# Patient Record
Sex: FEMALE | Race: WHITE | NOT HISPANIC OR LATINO | Employment: FULL TIME | URBAN - METROPOLITAN AREA
[De-identification: names, ages, dates, MRNs, and addresses within clinical notes are randomized per-mention and may not be internally consistent; named-entity substitution may affect disease eponyms.]

---

## 2017-02-13 ENCOUNTER — ALLIED HEALTH/NURSE VISIT (OUTPATIENT)
Dept: NURSING | Facility: CLINIC | Age: 47
End: 2017-02-13
Payer: COMMERCIAL

## 2017-02-13 VITALS
BODY MASS INDEX: 41.1 KG/M2 | WEIGHT: 232 LBS | HEART RATE: 91 BPM | SYSTOLIC BLOOD PRESSURE: 144 MMHG | DIASTOLIC BLOOD PRESSURE: 86 MMHG

## 2017-02-13 DIAGNOSIS — Z30.42 DEPO-PROVERA CONTRACEPTIVE STATUS: Primary | ICD-10-CM

## 2017-02-13 PROCEDURE — 96372 THER/PROPH/DIAG INJ SC/IM: CPT

## 2017-02-13 PROCEDURE — 99207 ZZC NO CHARGE NURSE ONLY: CPT

## 2017-02-13 NOTE — MR AVS SNAPSHOT
After Visit Summary   2/13/2017    Adela Ernandez    MRN: [id number]           Patient Information     Date Of Birth          1970        Visit Information        Provider Department      2/13/2017 4:45 PM AN ANCILLARY [facility]        Today's Diagnoses     Depo-Provera contraceptive status    -  1       Follow-ups after your visit        Who to contact     If you have questions or need follow up information about today's clinic visit or your schedule please contact [facility] directly at [phone].  Normal or non-critical lab and imaging results will be communicated to you by [employer] plchart, letter or phone within 4 business days after the clinic has received the results. If you do not hear from us within 7 days, please contact the clinic through [email or web address] or phone. If you have a critical or abnormal lab result, we will notify you by phone as soon as possible.  Submit refill requests through [email or web address] or call your pharmacy and they will forward the refill request to us. Please allow 3 business days for your refill to be completed.          Additional Information About Your Visit        MyChart Information     [email or web address] gives you secure access to your electronic health record. If you see a primary care provider, you can also send messages to your care team and make appointments. If you have questions, please call your primary care clinic.  If you do not have a primary care provider, please call [phone] and they will assist you.        Care EveryWhere ID     This is your Care EveryWhere ID. This could be used by other organizations to access your [place] medical records  [id number]        Your Vitals Were     Pulse BMI (Body Mass Index)                91 41.1 kg/m2           Blood Pressure from Last 3 Encounters:   02/13/17 144/86   11/14/16 144/84   10/27/16 131/82    Weight from Last 3 Encounters:   02/13/17 232 lb (105.2 kg)   10/27/16 236 lb (107 kg)   10/21/16 238 lb  (108 kg)              We Performed the Following     C Medroxyprogesterone inj/1mg        Primary Care Provider Office Phone # Fax #    Gena Hernandez [phone]       [facility] [address] [facility] [address]        Thank you!     Thank you for choosing [facility]  for your care. Our goal is always to provide you with excellent care. Hearing back from our patients is one way we can continue to improve our services. Please take a few minutes to complete the written survey that you may receive in the mail after your visit with us. Thank you!             Your Updated Medication List - Protect others around you: Learn how to safely use, store and throw away your medicines at www.disposemymeds.org.          This list is accurate as of: 2/13/17  4:50 PM.  Always use your most recent med list.                   Brand Name Dispense Instructions for use    alum & mag hydroxide-simethicone 200-200-20 MG/5ML Susp suspension    MYLANTA/MAALOX     Take 15 mLs by mouth once as needed for indigestion       lidocaine 2 % solution    XYLOCAINE    15 mL    Take 15 mLs by mouth every 2 hours as needed for moderate pain swish and spit every 3-8 hours as needed; max 8 doses/24 hour period       losartan-hydrochlorothiazide 50-12.5 MG per tablet    HYZAAR         * medroxyPROGESTERone 150 MG/ML injection    DEPO-PROVERA    1 mL    Inject 1 mL (150 mg) into the muscle every 3 months       * medroxyPROGESTERone 150 MG/ML injection    DEPO-PROVERA    1 mL    Inject 1 mL (150 mg) into the muscle every 3 months       sulfamethoxazole-trimethoprim 400-80 MG per tablet    BACTRIM/SEPTRA    14 tablet    Take 1 tablet by mouth 2 times daily       * Notice:  This list has 2 medication(s) that are the same as other medications prescribed for you. Read the directions carefully, and ask your doctor or other care provider to review them with you.

## 2017-02-13 NOTE — NURSING NOTE
BLOOD PRESSURE: 144/86       URINE HCG:not indicated  The following medication was given:   MEDICATION: Depo Provera 150mg  ROUTE: IM  SITE: Arm - Right  : Mayomi  LOT #: [id number]  EXPIRATION:8/2019  NEXT INJECTION DUE: MAY 1 - 15 2017  Provider: PITA Gregory MA

## 2017-05-03 ENCOUNTER — ALLIED HEALTH/NURSE VISIT (OUTPATIENT)
Dept: NURSING | Facility: CLINIC | Age: 47
End: 2017-05-03
Payer: COMMERCIAL

## 2017-05-03 VITALS
HEART RATE: 78 BPM | SYSTOLIC BLOOD PRESSURE: 134 MMHG | DIASTOLIC BLOOD PRESSURE: 75 MMHG | WEIGHT: 236 LBS | BODY MASS INDEX: 41.81 KG/M2

## 2017-05-03 DIAGNOSIS — Z30.42 DEPO-PROVERA CONTRACEPTIVE STATUS: Primary | ICD-10-CM

## 2017-05-03 PROCEDURE — 99207 ZZC NO CHARGE NURSE ONLY: CPT

## 2017-05-03 PROCEDURE — 96372 THER/PROPH/DIAG INJ SC/IM: CPT

## 2017-05-03 NOTE — NURSING NOTE
BLOOD PRESSURE: 134/75           URINE HCG:not indicated    The following medication was given:     MEDICATION: Depo Provera 150mg  ROUTE: IM  SITE: Deltoid - Right  : Epoxy  LOT #: [id number]  EXPIRATION:11/2019  NEXT INJECTION DUE: July 19 - AUG 2 2017  Provider: PITA Gregory MA

## 2017-05-03 NOTE — MR AVS SNAPSHOT
After Visit Summary   5/3/2017    Adela Ernandez    MRN: [id number]           Patient Information     Date Of Birth          1970        Visit Information        Provider Department      5/3/2017 4:45 PM AN ANCILLARY [facility]        Today's Diagnoses     Depo-Provera contraceptive status    -  1       Follow-ups after your visit        Who to contact     If you have questions or need follow up information about today's clinic visit or your schedule please contact [facility] directly at [phone].  Normal or non-critical lab and imaging results will be communicated to you by Bombfellhart, letter or phone within 4 business days after the clinic has received the results. If you do not hear from us within 7 days, please contact the clinic through Trulioot or phone. If you have a critical or abnormal lab result, we will notify you by phone as soon as possible.  Submit refill requests through tweetTV or call your pharmacy and they will forward the refill request to us. Please allow 3 business days for your refill to be completed.          Additional Information About Your Visit        MyChart Information     tweetTV gives you secure access to your electronic health record. If you see a primary care provider, you can also send messages to your care team and make appointments. If you have questions, please call your primary care clinic.  If you do not have a primary care provider, please call [phone] and they will assist you.        Care EveryWhere ID     This is your Care EveryWhere ID. This could be used by other organizations to access your [place] medical records  [id number]        Your Vitals Were     Pulse BMI (Body Mass Index)                78 41.81 kg/m2           Blood Pressure from Last 3 Encounters:   05/03/17 134/75   02/13/17 144/86   11/14/16 144/84    Weight from Last 3 Encounters:   05/03/17 236 lb (107 kg)   02/13/17 232 lb (105.2 kg)   10/27/16 236 lb  (107 kg)              We Performed the Following     C Medroxyprogesterone inj/1mg        Primary Care Provider Office Phone # Fax #    Gena Hernandez [phone]       [facility] [address] [address] [facility] [address]        Thank you!     Thank you for choosing [facility]  for your care. Our goal is always to provide you with excellent care. Hearing back from our patients is one way we can continue to improve our services. Please take a few minutes to complete the written survey that you may receive in the mail after your visit with us. Thank you!             Your Updated Medication List - Protect others around you: Learn how to safely use, store and throw away your medicines at www.disposemymeds.org.          This list is accurate as of: 5/3/17  4:59 PM.  Always use your most recent med list.                   Brand Name Dispense Instructions for use    alum & mag hydroxide-simethicone 200-200-20 MG/5ML Susp suspension    MYLANTA/MAALOX     Take 15 mLs by mouth once as needed for indigestion       lidocaine 2 % solution    XYLOCAINE    15 mL    Take 15 mLs by mouth every 2 hours as needed for moderate pain swish and spit every 3-8 hours as needed; max 8 doses/24 hour period       losartan-hydrochlorothiazide 50-12.5 MG per tablet    HYZAAR         * medroxyPROGESTERone 150 MG/ML injection    DEPO-PROVERA    1 mL    Inject 1 mL (150 mg) into the muscle every 3 months       * medroxyPROGESTERone 150 MG/ML injection    DEPO-PROVERA    1 mL    Inject 1 mL (150 mg) into the muscle every 3 months       sulfamethoxazole-trimethoprim 400-80 MG per tablet    BACTRIM/SEPTRA    14 tablet    Take 1 tablet by mouth 2 times daily       * Notice:  This list has 2 medication(s) that are the same as other medications prescribed for you. Read the directions carefully, and ask your doctor or other care provider to review them with you.

## 2017-07-19 ENCOUNTER — ALLIED HEALTH/NURSE VISIT (OUTPATIENT)
Dept: NURSING | Facility: CLINIC | Age: 47
End: 2017-07-19
Payer: COMMERCIAL

## 2017-07-19 VITALS
DIASTOLIC BLOOD PRESSURE: 81 MMHG | WEIGHT: 238 LBS | SYSTOLIC BLOOD PRESSURE: 145 MMHG | HEART RATE: 81 BPM | BODY MASS INDEX: 42.16 KG/M2

## 2017-07-19 DIAGNOSIS — Z30.42 DEPO-PROVERA CONTRACEPTIVE STATUS: Primary | ICD-10-CM

## 2017-07-19 PROCEDURE — 99207 ZZC NO CHARGE NURSE ONLY: CPT

## 2017-07-19 PROCEDURE — 96372 THER/PROPH/DIAG INJ SC/IM: CPT

## 2017-07-19 NOTE — NURSING NOTE
BLOOD PRESSURE: 145/81    The following medication was given:     MEDICATION: Depo Provera 150mg  ROUTE: IM  SITE: Deltoid - Right  : Money Dashboard  LOT #: [id number]  EXPIRATION:12/2019  NEXT INJECTION DUE: OCT 4- 18 2017  Provider: PITA Gregory MA

## 2017-07-19 NOTE — MR AVS SNAPSHOT
After Visit Summary   7/19/2017    Adela Ernandez    MRN: [id number]           Patient Information     Date Of Birth          1970        Visit Information        Provider Department      7/19/2017 4:45 PM AN ANCILLARY [facility]        Today's Diagnoses     Depo-Provera contraceptive status    -  1       Follow-ups after your visit        Who to contact     If you have questions or need follow up information about today's clinic visit or your schedule please contact [facility] directly at [phone].  Normal or non-critical lab and imaging results will be communicated to you by STAR FESTIVALhart, letter or phone within 4 business days after the clinic has received the results. If you do not hear from us within 7 days, please contact the clinic through [email or web address] or phone. If you have a critical or abnormal lab result, we will notify you by phone as soon as possible.  Submit refill requests through ImmuRx or call your pharmacy and they will forward the refill request to us. Please allow 3 business days for your refill to be completed.          Additional Information About Your Visit        MyChart Information     ImmuRx gives you secure access to your electronic health record. If you see a primary care provider, you can also send messages to your care team and make appointments. If you have questions, please call your primary care clinic.  If you do not have a primary care provider, please call [phone] and they will assist you.        Care EveryWhere ID     This is your Care EveryWhere ID. This could be used by other organizations to access your [place] medical records  [id number]        Your Vitals Were     Pulse BMI (Body Mass Index)                81 42.16 kg/m2           Blood Pressure from Last 3 Encounters:   07/19/17 145/81   05/03/17 134/75   02/13/17 144/86    Weight from Last 3 Encounters:   07/19/17 238 lb (108 kg)   05/03/17 236 lb (107 kg)   02/13/17 232 lb  (105.2 kg)              We Performed the Following     C Medroxyprogesterone inj/1mg        Primary Care Provider Office Phone # Fax #    Gena Hernandez [phone]       [facility] [address] [facility] [address]        Equal Access to Services     SHANT CLARK : Hadii aad ku hadasho Soomaali, waaxda luqadaha, qaybta kaalmada adeegyada, waxay idiin hayaan adeeg khsidneysh la'wilsonn . So [facility] [phone].    ATENCIÓN: Si habla español, tiene a michael disposición servicios gratuitos de asistencia lingüística. Llame al [phone].    We comply with applicable federal civil rights laws and [place] laws. We do not discriminate on the basis of race, color, national origin, age, disability sex, sexual orientation or gender identity.            Thank you!     Thank you for choosing [facility]  for your care. Our goal is always to provide you with excellent care. Hearing back from our patients is one way we can continue to improve our services. Please take a few minutes to complete the written survey that you may receive in the mail after your visit with us. Thank you!             Your Updated Medication List - Protect others around you: Learn how to safely use, store and throw away your medicines at www.disposemymeds.org.          This list is accurate as of: 7/19/17  4:55 PM.  Always use your most recent med list.                   Brand Name Dispense Instructions for use Diagnosis    alum & mag hydroxide-simethicone 200-200-20 MG/5ML Susp suspension    MYLANTA/MAALOX     Take 15 mLs by mouth once as needed for indigestion    Abdominal pain, epigastric       lidocaine (viscous) 2 % solution    XYLOCAINE    15 mL    Take 15 mLs by mouth every 2 hours as needed for moderate pain swish and spit every 3-8 hours as needed; max 8 doses/24 hour period    Abdominal pain, epigastric       losartan-hydrochlorothiazide 50-12.5 MG per tablet    HYZAAR          * medroxyPROGESTERone 150  MG/ML injection    DEPO-PROVERA    1 mL    Inject 1 mL (150 mg) into the muscle every 3 months    Contraception       * medroxyPROGESTERone 150 MG/ML injection    DEPO-PROVERA    1 mL    Inject 1 mL (150 mg) into the muscle every 3 months    Surveillance for Depo-Provera contraception       sulfamethoxazole-trimethoprim 400-80 MG per tablet    BACTRIM/SEPTRA    14 tablet    Take 1 tablet by mouth 2 times daily    Acute urinary tract infection       * Notice:  This list has 2 medication(s) that are the same as other medications prescribed for you. Read the directions carefully, and ask your doctor or other care provider to review them with you.

## 2017-08-25 DIAGNOSIS — I10 ESSENTIAL HYPERTENSION: Primary | ICD-10-CM

## 2017-08-25 RX ORDER — LOSARTAN POTASSIUM AND HYDROCHLOROTHIAZIDE 12.5; 5 MG/1; MG/1
1 TABLET ORAL DAILY
Qty: 30 TABLET | Refills: 0 | Status: SHIPPED | OUTPATIENT
Start: 2017-08-25 | End: 2017-09-19

## 2017-08-25 NOTE — TELEPHONE ENCOUNTER
To provider to advise on refill. Med is listed historical. Last OV 10/27/16 in urgent care.  .Cathryn CHAON, RN, CPN

## 2017-08-25 NOTE — LETTER
August 25, 2017    Adela Ernandez  [address] [facility] [address]    Dear Adela,       We recently received a refill request for losartan-hydrochlorothiazide.  We have refilled this for a one time 30 day supply only because you are due for a:    Blood pressure office visit with provider      Please call at your earliest convenience so that there will not be a delay with your future refills.          Thank you,   Your [facility] Team/  [phone]

## 2017-09-19 ENCOUNTER — TELEPHONE (OUTPATIENT)
Dept: FAMILY MEDICINE | Facility: CLINIC | Age: 47
End: 2017-09-19

## 2017-09-19 ENCOUNTER — TELEPHONE (OUTPATIENT)
Dept: NURSING | Facility: CLINIC | Age: 47
End: 2017-09-19

## 2017-09-19 DIAGNOSIS — I10 ESSENTIAL HYPERTENSION: ICD-10-CM

## 2017-09-19 RX ORDER — LOSARTAN POTASSIUM AND HYDROCHLOROTHIAZIDE 12.5; 5 MG/1; MG/1
1 TABLET ORAL DAILY
Qty: 30 TABLET | Refills: 0 | Status: SHIPPED | OUTPATIENT
Start: 2017-09-19 | End: 2017-10-11

## 2017-09-19 NOTE — TELEPHONE ENCOUNTER
Losartan      Last Written Prescription Date: 8/25/17  Last Fill Quantity: 30, # refills: 0  Last Office Visit with G, P or [facility] prescribing provider: ?  Next 5 appointments (look out 90 days)     Oct 11, 2017  5:20 PM CDT   [facility]    [address] [facility] [address] [phone]                   Potassium   Date Value Ref Range Status   10/27/2016 3.8 3.4 - 5.3 mmol/L Final     Creatinine   Date Value Ref Range Status   10/27/2016 0.56 0.52 - 1.04 mg/dL Final     BP Readings from Last 3 Encounters:   07/19/17 145/81   05/03/17 134/75   02/13/17 144/86     Patient got a message in August that she can't get any more refills until she is seen by the doctor. She scheduled an appointment, but there is going to be a gap in her Losartin. Please call her at [phone].    Fara Dale RN  [facility]  [phone]

## 2017-09-19 NOTE — TELEPHONE ENCOUNTER
Reason for Call:  Other Medication Renewal    Detailed comments: Physical is scheduled for October 11, will run out of losartan on September 30th. Looking to have that renewed for the few days before she is able to come in for physical.     Phone Number Patient can be reached at: Cell number on file:    Telephone Information:   Mobile [phone]       Best Time: Any    Can we leave a detailed message on this number? YES    Call taken on 9/19/2017 at 7:21 AM by Anastasia Bello

## 2017-10-11 ENCOUNTER — OFFICE VISIT (OUTPATIENT)
Dept: FAMILY MEDICINE | Facility: CLINIC | Age: 47
End: 2017-10-11
Payer: COMMERCIAL

## 2017-10-11 VITALS
TEMPERATURE: 97 F | DIASTOLIC BLOOD PRESSURE: 89 MMHG | WEIGHT: 243 LBS | SYSTOLIC BLOOD PRESSURE: 139 MMHG | HEART RATE: 106 BPM | BODY MASS INDEX: 44.72 KG/M2 | HEIGHT: 62 IN

## 2017-10-11 DIAGNOSIS — E11.9 TYPE 2 DIABETES MELLITUS WITHOUT COMPLICATION, WITHOUT LONG-TERM CURRENT USE OF INSULIN (H): ICD-10-CM

## 2017-10-11 DIAGNOSIS — Z12.31 ENCOUNTER FOR SCREENING MAMMOGRAM FOR BREAST CANCER: ICD-10-CM

## 2017-10-11 DIAGNOSIS — I10 ESSENTIAL HYPERTENSION: ICD-10-CM

## 2017-10-11 DIAGNOSIS — Z12.4 SCREENING FOR MALIGNANT NEOPLASM OF CERVIX: ICD-10-CM

## 2017-10-11 DIAGNOSIS — E66.01 MORBID OBESITY (H): ICD-10-CM

## 2017-10-11 DIAGNOSIS — E78.1 HIGH TRIGLYCERIDES: ICD-10-CM

## 2017-10-11 DIAGNOSIS — Z30.42 ENCOUNTER FOR SURVEILLANCE OF INJECTABLE CONTRACEPTIVE: ICD-10-CM

## 2017-10-11 DIAGNOSIS — Z00.00 ROUTINE GENERAL MEDICAL EXAMINATION AT A HEALTH CARE FACILITY: Primary | ICD-10-CM

## 2017-10-11 LAB — HBA1C MFR BLD: 7.9 % (ref 4.3–6)

## 2017-10-11 PROCEDURE — 83036 HEMOGLOBIN GLYCOSYLATED A1C: CPT | Performed by: FAMILY MEDICINE

## 2017-10-11 PROCEDURE — 99396 PREV VISIT EST AGE 40-64: CPT | Mod: 25 | Performed by: FAMILY MEDICINE

## 2017-10-11 PROCEDURE — G0145 SCR C/V CYTO,THINLAYER,RESCR: HCPCS | Performed by: FAMILY MEDICINE

## 2017-10-11 PROCEDURE — 87624 HPV HI-RISK TYP POOLED RSLT: CPT | Performed by: FAMILY MEDICINE

## 2017-10-11 PROCEDURE — 80048 BASIC METABOLIC PNL TOTAL CA: CPT | Performed by: FAMILY MEDICINE

## 2017-10-11 PROCEDURE — 96372 THER/PROPH/DIAG INJ SC/IM: CPT | Performed by: FAMILY MEDICINE

## 2017-10-11 PROCEDURE — 36415 COLL VENOUS BLD VENIPUNCTURE: CPT | Performed by: FAMILY MEDICINE

## 2017-10-11 PROCEDURE — 99207 C FOOT EXAM  NO CHARGE: CPT | Mod: 25 | Performed by: FAMILY MEDICINE

## 2017-10-11 RX ORDER — MEDROXYPROGESTERONE ACETATE 150 MG/ML
150 INJECTION, SUSPENSION INTRAMUSCULAR
Qty: 1 ML | Refills: 3 | OUTPATIENT
Start: 2017-10-11 | End: 2018-10-30

## 2017-10-11 RX ORDER — LOSARTAN POTASSIUM AND HYDROCHLOROTHIAZIDE 12.5; 5 MG/1; MG/1
1 TABLET ORAL DAILY
Qty: 90 TABLET | Refills: 1 | Status: SHIPPED | OUTPATIENT
Start: 2017-10-11 | End: 2018-04-15

## 2017-10-11 NOTE — MR AVS SNAPSHOT
After Visit Summary   10/11/2017    Adela Ernandez    MRN: [id number]           Patient Information     Date Of Birth          1970        Visit Information        Provider Department      10/11/2017 5:20 PM Gena Hernandez MD [facility]        Today's Diagnoses     Routine general medical examination at a health care facility    -  1    Type 2 diabetes mellitus without complication, without long-term current use of insulin (H)        Morbid obesity (H)        Essential hypertension        High triglycerides        Screening for malignant neoplasm of cervix        Encounter for surveillance of injectable contraceptive        Encounter for screening mammogram for breast cancer          Care Instructions      Preventive Health Recommendations  Female Ages 40 to 49    Yearly exam:     See your health care provider every year in order to  1. Review health changes.   2. Discuss preventive care.    3. Review your medicines if your doctor prescribed any.      Get a Pap test every three years (unless you have an abnormal result and your provider advises testing more often).      If you get Pap tests with HPV test, you only need to test every 5 years, unless you have an abnormal result. You do not need a Pap test if your uterus was removed (hysterectomy) and you have not had cancer.      You should be tested each year for STDs (sexually transmitted diseases), if you're at risk.       Ask your doctor if you should have a mammogram.      Have a colonoscopy (test for colon cancer) if someone in your family has had colon cancer or polyps before age 50.       Have a cholesterol test every 5 years.       Have a diabetes test (fasting glucose) after age 45. If you are at risk for diabetes, you should have this test every 3 years.    Shots: Get a flu shot each year. Get a tetanus shot every 10 years.     Nutrition:     Eat at least 5 servings of fruits and vegetables each day.    Eat whole-grain bread,  whole-wheat pasta and brown rice instead of white grains and rice.    Talk to your provider about Calcium and Vitamin D.     Lifestyle    Exercise at least 150 minutes a week (an average of 30 minutes a day, 5 days a week). This will help you control your weight and prevent disease.    Limit alcohol to one drink per day.    No smoking.     Wear sunscreen to prevent skin cancer.    See your dentist every six months for an exam and cleaning.          Follow-ups after your visit        Future tests that were ordered for you today     Open Future Orders        Priority Expected Expires Ordered    MA Screening Digital Bilateral Routine  10/11/2018 10/11/2017            Who to contact     If you have questions or need follow up information about today's clinic visit or your schedule please contact [facility] at [phone].  Normal or non-critical lab and imaging results will be communicated to you by MyChart, letter or phone within 4 business days after the clinic has received the results. If you do not hear from us within 7 days, please contact the clinic through Inuvohart or phone. If you have a critical or abnormal lab result, we will notify you by phone as soon as possible.  Submit refill requests through [employer] or call your pharmacy and they will forward the refill request to us. Please allow 3 business days for your refill to be completed.          Additional Information About Your Visit        [employer] Information     [employer] gives you secure access to your electronic health record. If you see a primary care provider, you can also send messages to your care team and make appointments. If you have questions, please call your primary care clinic.  If you do not have a primary care provider, please call [phone] and they will assist you.        Care EveryWhere ID     This is your Care EveryWhere ID. This could be used by other organizations to access your [facility]  "records  [id number]        Your Vitals Were     Pulse Temperature Height BMI (Body Mass Index)          106 97  F (36.1  C) (Oral) 5' 2\" (1.575 m) 44.45 kg/m2         Blood Pressure from Last 3 Encounters:   10/11/17 139/89   07/19/17 145/81   05/03/17 134/75    Weight from Last 3 Encounters:   10/11/17 243 lb (110.2 kg)   07/19/17 238 lb (108 kg)   05/03/17 236 lb (107 kg)              We Performed the Following     Basic metabolic panel     FOOT EXAM     Hemoglobin A1c     HPV High Risk Types DNA Cervical     Medroxyprogesterone inj/1mg (Depo Provera J-Code)     Pap imaged thin layer screen with HPV - recommended age 30 - 65 years (select HPV order below)          Today's Medication Changes          These changes are accurate as of: 10/11/17  9:43 PM.  If you have any questions, ask your nurse or doctor.               These medicines have changed or have updated prescriptions.        Dose/Directions    losartan-hydrochlorothiazide 50-12.5 MG per tablet   Commonly known as:  HYZAAR   This may have changed:  additional instructions   Used for:  Essential hypertension   Changed by:  Gena Hernandez MD        Dose:  1 tablet   Take 1 tablet by mouth daily   Quantity:  90 tablet   Refills:  1            Where to get your medicines      These medications were sent to [facility] [address] [place] [address] [address] [place] [address] AT SEC of [address]  [address] [facility] [address]     Phone:  [phone]     losartan-hydrochlorothiazide 50-12.5 MG per tablet         Some of these will need a paper prescription and others can be bought over the counter.  Ask your nurse if you have questions.     You don't need a prescription for these medications     medroxyPROGESTERone 150 MG/ML injection                Primary Care Provider Office Phone # Fax #    Gena Hernandez [phone] [address] [facility] [address]        Equal Access to Services     SHANT CLARK AH: " Hadii jasmyn Suh, washireenda luqadaha, qagabrielleta kamaría elena yancey, rolf cheikh reymundopeggy butlercallie rosanneailyn lanelpeggy elizabeth. So [facility] [phone].    ATENCIÓN: Si habla tarik, tiene a michael disposición servicios gratuitos de asistencia lingüística. Llame al [phone].    We comply with applicable federal civil rights laws and [place] laws. We do not discriminate on the basis of race, color, national origin, age, disability, sex, sexual orientation, or gender identity.            Thank you!     Thank you for choosing [facility]  [facility] your [facility]. Our goal is always to provide you with excellent care. Hearing back from our patients is one way we can continue to improve our services. Please take a few minutes to complete the written survey that you may receive in the mail after your visit with us. Thank you!             Your Updated Medication List - Protect others around you: Learn how to safely use, store and throw away your medicines at www.disposemymeds.org.          This list is accurate as of: 10/11/17  9:43 PM.  Always use your most recent med list.                   Brand Name Dispense Instructions for use Diagnosis    losartan-hydrochlorothiazide 50-12.5 MG per tablet    HYZAAR    90 tablet    Take 1 tablet by mouth daily    Essential hypertension       medroxyPROGESTERone 150 MG/ML injection    DEPO-PROVERA    1 mL    Inject 1 mL (150 mg) into the muscle every 3 months    Encounter for surveillance of injectable contraceptive

## 2017-10-11 NOTE — PROGRESS NOTES
SUBJECTIVE:   CC: Adela Ernandez is an 47 year old woman who presents for preventive health visit.     Physical   Annual:     Getting at least 3 servings of Calcium per day::  Yes    Bi-annual eye exam::  Yes    Dental care twice a year::  Yes    Sleep apnea or symptoms of sleep apnea::  None    Diet::  Regular (no restrictions) and Low salt    Frequency of exercise::  1 day/week    Duration of exercise::  N/A    Taking medications regularly::  Yes    Medication side effects::  None     PT with diabetes, was seeing outside clinic. Has not had it checked in long time  Is not checking home BSs.   Is resistant to any unnecessary labwork due to cost.   She is agreeable to BMP and A1c        Today's PHQ-2 Score:   PHQ-2 ( 1999 [employer]) 10/11/2017   Q1: Little interest or pleasure in doing things 0   Q2: Feeling down, depressed or hopeless 0   PHQ-2 Score 0   Q1: Little interest or pleasure in doing things Not at all   Q2: Feeling down, depressed or hopeless Not at all   PHQ-2 Score 0       Abuse: Current or Past(Physical, Sexual or Emotional)- No  Do you feel safe in your environment - Yes    Social History   Substance Use Topics     Smoking status: Never Smoker     Smokeless tobacco: Never Used      Comment: lives in a smoke free household.     Alcohol use 0.0 oz/week     0 Standard drinks or equivalent per week      Comment: rare     The patient does not drink >3 drinks per day nor >7 drinks per week.    Reviewed orders with patient.  Reviewed health maintenance and updated orders accordingly - Yes  Labs reviewed in EPIC    Patient under age 50, mutual decision reflected in health maintenance.        Pertinent mammograms are reviewed under the imaging tab.  History of abnormal Pap smear: NO - age 30-65 PAP every 5 years with negative HPV co-testing recommended    Reviewed and updated as needed this visit by clinical staffTobacco  Allergies  Meds  Med Hx  Surg Hx  Fam Hx  Soc Hx        Reviewed and updated as  "needed this visit by Provider              ROS:  C: NEGATIVE for fever, chills, change in weight  I: NEGATIVE for worrisome rashes, moles or lesions  E: NEGATIVE for vision changes or irritation  ENT: NEGATIVE for ear, mouth and throat problems  R: NEGATIVE for significant cough or SOB  B: NEGATIVE for masses, tenderness or discharge  CV: NEGATIVE for chest pain, palpitations or peripheral edema  GI: NEGATIVE for nausea, abdominal pain, heartburn, or change in bowel habits  : NEGATIVE for unusual urinary or vaginal symptoms. Periods are regular.  M: NEGATIVE for significant arthralgias or myalgia  N: NEGATIVE for weakness, dizziness or paresthesias  P: NEGATIVE for changes in mood or affect     OBJECTIVE:   /89  Pulse 106  Temp 97  F (36.1  C) (Oral)  Ht 5' 2\" (1.575 m)  Wt 243 lb (110.2 kg)  BMI 44.45 kg/m2  EXAM:  GENERAL: healthy, alert and no distress  EYES: Eyes grossly normal to inspection, PERRL and conjunctivae and sclerae normal  HENT: ear canals and TM's normal, nose and mouth without ulcers or lesions  NECK: no adenopathy, no asymmetry, masses, or scars and thyroid normal to palpation  RESP: lungs clear to auscultation - no rales, rhonchi or wheezes  BREAST: normal without masses, tenderness or nipple discharge and no palpable axillary masses or adenopathy  CV: regular rate and rhythm, normal S1 S2, no S3 or S4, no murmur, click or rub, no peripheral edema and peripheral pulses strong  ABDOMEN: soft, nontender, no hepatosplenomegaly, no masses and bowel sounds normal   (female): normal female external genitalia, normal urethral meatus, vaginal mucosa pink, moist, well rugated, and normal cervix/adnexa/uterus without masses or discharge  MS: no gross musculoskeletal defects noted, no edema  SKIN: no suspicious lesions or rashes  NEURO: Normal strength and tone, mentation intact and speech normal  PSYCH: mentation appears normal, affect normal/bright    ASSESSMENT/PLAN:   1. Routine general " "medical examination at a health care facility        2. Type 2 diabetes mellitus without complication, without long-term current use of insulin (H)  As above  - Hemoglobin A1c  - Basic metabolic panel  - FOOT EXAM    3. Morbid obesity (H)  Encourage regular exercise and healthy diet    4. Essential hypertension  At goal on meds  - losartan-hydrochlorothiazide (HYZAAR) 50-12.5 MG per tablet; Take 1 tablet by mouth daily  Dispense: 90 tablet; Refill: 1    5. High triglycerides      6. Screening for malignant neoplasm of cervix    - Pap imaged thin layer screen with HPV - recommended age 30 - 65 years (select HPV order below)  - HPV High Risk Types DNA Cervical    7. Encounter for surveillance of injectable contraceptive    - medroxyPROGESTERone (DEPO-PROVERA) 150 MG/ML injection; Inject 1 mL (150 mg) into the muscle every 3 months  Dispense: 1 mL; Refill: 3    8. Encounter for screening mammogram for breast cancer    - MA Screening Digital Bilateral; Future    COUNSELING:  Reviewed preventive health counseling, as reflected in patient instructions       Regular exercise       Healthy diet/nutrition       Vision screening         reports that she has never smoked. She has never used smokeless tobacco.    Estimated body mass index is 44.45 kg/(m^2) as calculated from the following:    Height as of this encounter: 5' 2\" (1.575 m).    Weight as of this encounter: 243 lb (110.2 kg).   Weight management plan: Discussed healthy diet and exercise guidelines and patient will follow up in 12 months in clinic to re-evaluate.    Counseling Resources:  ATP IV Guidelines  Pooled Cohorts Equation Calculator  Breast Cancer Risk Calculator  FRAX Risk Assessment  ICSI Preventive Guidelines  Dietary Guidelines for Americans, 2010  USDA's MyPlate  ASA Prophylaxis  Lung CA Screening    Gena Marcos MD  [facility]  Answers for HPI/ROS submitted by the patient on 10/11/2017   PHQ-2 Score: 0    "

## 2017-10-11 NOTE — NURSING NOTE
MEDICATION: Depo Provera 150mg  ROUTE: IM  SITE: Deltoid - Right  : [employer]  LOT #: [id number]  EXP:01/2020  NEXT INJECTION DUE: 12/27/17 - 1/10/18   Provider: Dr. Gena Hernandez

## 2017-10-11 NOTE — NURSING NOTE
"Chief Complaint   Patient presents with     Physical       Initial /89  Pulse 106  Temp 97  F (36.1  C) (Oral)  Ht 5' 2\" (1.575 m)  Wt 243 lb (110.2 kg)  BMI 44.45 kg/m2 Estimated body mass index is 44.45 kg/(m^2) as calculated from the following:    Height as of this encounter: 5' 2\" (1.575 m).    Weight as of this encounter: 243 lb (110.2 kg).  Medication Reconciliation: darian Huffman MA      "

## 2017-10-12 LAB
ANION GAP SERPL CALCULATED.3IONS-SCNC: 10 MMOL/L (ref 3–14)
BUN SERPL-MCNC: 7 MG/DL (ref 7–30)
CALCIUM SERPL-MCNC: 8.6 MG/DL (ref 8.5–10.1)
CHLORIDE SERPL-SCNC: 99 MMOL/L (ref 94–109)
CO2 SERPL-SCNC: 27 MMOL/L (ref 20–32)
CREAT SERPL-MCNC: 0.52 MG/DL (ref 0.52–1.04)
GFR SERPL CREATININE-BSD FRML MDRD: >90 ML/MIN/1.7M2
GLUCOSE SERPL-MCNC: 148 MG/DL (ref 70–99)
POTASSIUM SERPL-SCNC: 3.5 MMOL/L (ref 3.4–5.3)
SODIUM SERPL-SCNC: 136 MMOL/L (ref 133–144)

## 2017-10-12 RX ORDER — METFORMIN HCL 500 MG
500 TABLET, EXTENDED RELEASE 24 HR ORAL 2 TIMES DAILY WITH MEALS
Qty: 180 TABLET | Refills: 0 | Status: SHIPPED | OUTPATIENT
Start: 2017-10-12 | End: 2018-05-29

## 2017-10-13 ENCOUNTER — TELEPHONE (OUTPATIENT)
Dept: EDUCATION SERVICES | Facility: CLINIC | Age: 47
End: 2017-10-13

## 2017-10-13 LAB
COPATH REPORT: NORMAL
PAP: NORMAL

## 2017-10-13 NOTE — TELEPHONE ENCOUNTER
Diabetes Education Scheduling Outreach #1:    Call to patient to schedule. Left message with phone number to call to schedule.    Plan for 2nd outreach attempt within 1 week.    Sonia Isabel  [facility]  Diabetes and Nutrition Scheduling

## 2017-10-16 ENCOUNTER — TELEPHONE (OUTPATIENT)
Dept: FAMILY MEDICINE | Facility: CLINIC | Age: 47
End: 2017-10-16

## 2017-10-16 LAB
FINAL DIAGNOSIS: NORMAL
HPV HR 12 DNA CVX QL NAA+PROBE: NEGATIVE
HPV16 DNA SPEC QL NAA+PROBE: NEGATIVE
HPV18 DNA SPEC QL NAA+PROBE: NEGATIVE
SPECIMEN DESCRIPTION: NORMAL

## 2017-10-16 NOTE — TELEPHONE ENCOUNTER
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 Please update, thank you.

## 2017-10-16 NOTE — TELEPHONE ENCOUNTER
Pt has been seen at other clinic and just recently resumed care at this clinic.  Just readdressed diabetes.  Will get statin on board in near future.     Gena Marcos

## 2017-10-20 NOTE — TELEPHONE ENCOUNTER
Diabetes Education Scheduling Outreach #2:    Call to patient to schedule. Left message with phone number to call to schedule.    Letter sent to patient requesting to call to schedule.    [facility]  Diabetes and Nutrition Scheduling

## 2017-12-06 ENCOUNTER — ALLIED HEALTH/NURSE VISIT (OUTPATIENT)
Dept: EDUCATION SERVICES | Facility: CLINIC | Age: 47
End: 2017-12-06
Payer: COMMERCIAL

## 2017-12-06 VITALS — BODY MASS INDEX: 44.63 KG/M2 | WEIGHT: 244 LBS

## 2017-12-06 DIAGNOSIS — E11.9 TYPE 2 DIABETES, HBA1C GOAL < 7% (H): Primary | ICD-10-CM

## 2017-12-06 PROCEDURE — G0108 DIAB MANAGE TRN  PER INDIV: HCPCS

## 2017-12-06 NOTE — MR AVS SNAPSHOT
After Visit Summary   12/6/2017    Adela Ernandez    MRN: [id number]           Patient Information     Date Of Birth          1970        Visit Information        Provider Department      12/6/2017 4:30 PM  DIABETIC ED RESOURCE [facility]        Today's Diagnoses     Type 2 diabetes, HbA1c goal < 7% (H)    -  1      Care Instructions    Recommend 3 carb choices per meal at 3 meals per day and 1 carb choice for a snack.  Patient goal: to exercise 15-20 minutes on 6 days per week.  Discuss metformin with pharmacist and or MD.    Call insurance re testing supplies and coverage for education.          Follow-ups after your visit        Your next 10 appointments already scheduled     Dec 27, 2017  7:30 AM CST   Nurse Only with AN ANCILLARY   [facility]    [address] [facility] [address] [phone]            Dec 27, 2017  9:15 AM CST   Diabetic Education with  DIABETIC ED RESOURCE   [facility] ([facility])    [address] [address] [phone]              Who to contact     If you have questions or need follow up information about today's clinic visit or your schedule please contact [facility] directly at [phone].  Normal or non-critical lab and imaging results will be communicated to you by [employer], letter or phone within 4 business days after the clinic has received the results. If you do not hear from us within 7 days, please contact the clinic through [employer] or phone. If you have a critical or abnormal lab result, we will notify you by phone as soon as possible.  Submit refill requests through [employer] or call your pharmacy and they will forward the refill request to us. Please allow 3 business days for your refill to be completed.          Additional Information About Your Visit        [employer] Information     [employer] gives you secure access to your  electronic health record. If you see a primary care provider, you can also send messages to your care team and make appointments. If you have questions, please call your primary care clinic.  If you do not have a primary care provider, please call [phone] and they will assist you.        Care EveryWhere ID     This is your Care EveryWhere ID. This could be used by other organizations to access your [facility] medical records  [id number]        Your Vitals Were     BMI (Body Mass Index)                   44.63 kg/m2            Blood Pressure from Last 3 Encounters:   10/11/17 139/89   07/19/17 145/81   05/03/17 134/75    Weight from Last 3 Encounters:   12/06/17 244 lb (110.7 kg)   10/11/17 243 lb (110.2 kg)   07/19/17 238 lb (108 kg)              Today, you had the following     No orders found for display       Primary Care Provider Office Phone # Fax #    Gena Hernandez [phone] [address] [facility] [address]        Equal Access to Services     [facility]: Hadii aad ku hadasho Soomaali, waaxda luqadaha, qaybta kaalmada adeegyada, waxay cheikh haymeli valdez . So [facility] [phone].    ATENCIÓN: Si habla español, tiene a michael disposición servicios gratuitos de asistencia lingüística. Llame al [phone].    We comply with applicable federal civil rights laws and [place] laws. We do not discriminate on the basis of race, color, national origin, age, disability, sex, sexual orientation, or gender identity.            Thank you!     Thank you for choosing [facility]  for your care. Our goal is always to provide you with excellent care. Hearing back from our patients is one way we can continue to improve our services. Please take a few minutes to complete the written survey that you may receive in the mail after your visit with us. Thank you!             Your Updated Medication List - Protect others around you: Learn how to safely use, store and  throw away your medicines at www.disposemymeds.org.          This list is accurate as of: 12/6/17  5:53 PM.  Always use your most recent med list.                   Brand Name Dispense Instructions for use Diagnosis    losartan-hydrochlorothiazide 50-12.5 MG per tablet    HYZAAR    90 tablet    Take 1 tablet by mouth daily    Essential hypertension       medroxyPROGESTERone 150 MG/ML injection    DEPO-PROVERA    1 mL    Inject 1 mL (150 mg) into the muscle every 3 months    Encounter for surveillance of injectable contraceptive       metFORMIN 500 MG 24 hr tablet    GLUCOPHAGE-XR    180 tablet    Take 1 tablet (500 mg) by mouth 2 times daily (with meals)    Type 2 diabetes mellitus without complication, without long-term current use of insulin (H)       RANITIDINE HCL PO      Take 150 mg by mouth as needed for other (pain in mid section from gall bladder)

## 2017-12-06 NOTE — PROGRESS NOTES
Diabetes Self Management Training: Initial education    Adela Ernandez presents today for education related to Type 2 diabetes.    She is accompanied by self    Patient's diabetes management related comments/concerns: I did not start metformin as it had a side effect that could bother my gall bladder. I already have trouble with my gall bladder.  I know I need to exercise more.  I have time.  Patient's emotional response to diabetes: expresses readiness to learn    Patient would like this visit to be focused around the following diabetes-related behaviors and goals: Healthy Eating    ASSESSMENT:  Patient Problem List and Family Medical History reviewed for relevant medical history, current medical status, and diabetes risk factors.  Patient diagnosed with diabetes about 1 year ago per patient recall. No formal education.    Current Diabetes Management per Patient:  Taking diabetes medications? no    *Abbreviated insulin dose documentation key: Insulin Trade Name (ifxmdkmta-tapkt-tgfzqu-bedtime) - i.e. Humalog 5-5-5-0 (Humalog 5 units at breakfast, 5 units at lunch, and 5 units at dinner).    Past Diabetes Education: No    Patient glucose self monitoring as follows: never.   BG meter: none  BG results: not available     BG values are: unable to assess  Patient's most recent   Lab Results   Component Value Date    A1C 7.9 10/11/2017    is not meeting goal of <7.0    Nutrition:  Patient eats 3 meals per day  Gets up at 4:30 am-  Breakfast - 5 am coffee, 7:30-8 am- granola bar or [employer]vita bars and water  Lunch - 1 pm - roast and carrots, drank water  Dinner - 5 pm- roast, carrots, pickles and olives, milk 2 cups   Snacks - may eat a snack at 10-10:30 am- handful of pretzels or nuts or fruit    Beverages:  water, milk skim, juice mary mix occasionally,  Diet Mt Dew occasionally, Gatorade G2, beer rarely,    Cultural/Rastafarian diet restrictions: No     Biggest Challenge to Healthy Eating: portion control, emotional eating  "and knowing what to eat    Physical Activity:    Type: Walking the dog for 30 minutes every other day.  Considering other options for activity.      Diabetes Risk Factors:  family history, age over 45 years, overweight/obesity and inactivity    Diabetes Complications:  Not discussed today.    Vitals:  Wt 244 lb (110.7 kg)  BMI 44.63 kg/m2  Estimated body mass index is 44.45 kg/(m^2) as calculated from the following:    Height as of 10/11/17: 5' 2\" (1.575 m).    Weight as of 10/11/17: 243 lb (110.2 kg).   Last 3 BP:   BP Readings from Last 3 Encounters:   10/11/17 139/89   07/19/17 145/81   05/03/17 134/75       History   Smoking Status     Never Smoker   Smokeless Tobacco     Never Used     Comment: lives in a smoke free household.       Labs:  Lab Results   Component Value Date    A1C 7.9 10/11/2017     Lab Results   Component Value Date     10/11/2017     Lab Results   Component Value Date    LDL 91 08/10/2012     HDL Cholesterol   Date Value Ref Range Status   08/10/2012 34 (L) 50 - 110 mg/dL Final   ]  GFR Estimate   Date Value Ref Range Status   10/11/2017 >90 >60 mL/min/1.7m2 Final     Comment:     Non  GFR Calc     GFR Estimate If Black   Date Value Ref Range Status   10/11/2017 >90 >60 mL/min/1.7m2 Final     Comment:      GFR Calc     Lab Results   Component Value Date    CR 0.52 10/11/2017     No results found for: MICROALBUMIN    Socio/Economic Considerations:    Support system: not assessed    Health Beliefs and Attitudes:   Patient Activation Measure Survey Score:  ODILON Score (Last Two) 1/26/2011 3/16/2012   ODILON Raw Score 42 40   Activation Score 66 60   ODILON Level 3 3       Stage of Change: PREPARATION (Decided to change - considering how)      Diabetes knowledge and skills assessment:     Patient is knowledgeable in diabetes management concepts related to: none    Patient needs further education on the following diabetes management concepts: Healthy Eating, Being " Active, Monitoring, Taking Medication, Problem Solving, Reducing Risks and Healthy Coping    Barriers to Learning Assessment: No Barriers identified,    Based on learning assessment above, most appropriate setting for further diabetes education would be: Group class or Individual setting.    INTERVENTION:   Education provided today on:  AADE Self-Care Behaviors:  Healthy Eating: carbohydrate counting, consistency in amount, composition, and timing of food intake, weight reduction and label reading  Being Active: relationship to blood glucose  Taking Medication: side effects of prescribed medications    Opportunities for ongoing education and support in diabetes-self management were discussed.    Pt verbalized understanding of concepts discussed and recommendations provided today.       Education Materials Provided:  [place] Understanding Diabetes Booklet and BG Log Sheet    PLAN:  See Patient Instructions for co-developed, patient-stated behavior change goals.  AVS printed and provided to patient today.  Patient given codes to check coverage for education with her insurance co.  Patient will also check insurance to see about coverage for testing supplies.    FOLLOW-UP:  Follow-up appointment scheduled on 12/27/17.  Chart routed to referring provider.  Patient will call if questions.    Ongoing plan for education and support: Follow-up visit with diabetes educator in 2-3 weeks    Malena Moore RN  BSN CDE    Time Spent: 60 minutes  Encounter Type: Individual    Any diabetes medication dose changes were made via the CDE Protocol and Collaborative Practice Agreement with the patient's referring provider. A copy of this encounter was shared with the provider.

## 2017-12-06 NOTE — PATIENT INSTRUCTIONS
Recommend 3 carb choices per meal at 3 meals per day and 1 carb choice for a snack.  Patient goal: to exercise 15-20 minutes on 6 days per week.  Discuss metformin with pharmacist and or MD.    Call insurance re testing supplies and coverage for education.

## 2017-12-27 ENCOUNTER — ALLIED HEALTH/NURSE VISIT (OUTPATIENT)
Dept: NURSING | Facility: CLINIC | Age: 47
End: 2017-12-27
Payer: COMMERCIAL

## 2017-12-27 VITALS — BODY MASS INDEX: 44.45 KG/M2 | WEIGHT: 243 LBS

## 2017-12-27 DIAGNOSIS — Z30.42 DEPO-PROVERA CONTRACEPTIVE STATUS: Primary | ICD-10-CM

## 2017-12-27 PROCEDURE — 99207 ZZC NO CHARGE NURSE ONLY: CPT

## 2017-12-27 PROCEDURE — 96372 THER/PROPH/DIAG INJ SC/IM: CPT

## 2017-12-27 NOTE — PROGRESS NOTES
BP: Data Unavailable    LAST PAP/EXAM: Lab Results       Component                Value               Date                       PAP                      NIL                 10/11/2017            URINE HCG:not indicated    The following medication was given:     MEDICATION: Depo Provera 150mg  ROUTE: IM  SITE: Deltoid - Right  : [employer]  LOT #: [id number]  EXP:5/2019  NEXT INJECTION DUE: 3/14/18 - 3/28/18   Provider: Gena Gregory MA

## 2017-12-27 NOTE — MR AVS SNAPSHOT
After Visit Summary   12/27/2017    Adela Ernandez    MRN: [id number]           Patient Information     Date Of Birth          1970        Visit Information        Provider Department      12/27/2017 7:30 AM AN ANCILLARY [facility]        Today's Diagnoses     Depo-Provera contraceptive status    -  1       Follow-ups after your visit        Who to contact     If you have questions or need follow up information about today's clinic visit or your schedule please contact [facility] directly at [phone].  Normal or non-critical lab and imaging results will be communicated to you by Tag[employer]art, letter or phone within 4 business days after the clinic has received the results. If you do not hear from us within 7 days, please contact the clinic through [employer] or phone. If you have a critical or abnormal lab result, we will notify you by phone as soon as possible.  Submit refill requests through [employer] or call your pharmacy and they will forward the refill request to us. Please allow 3 business days for your refill to be completed.          Additional Information About Your Visit        MyChart Information     [employer] gives you secure access to your electronic health record. If you see a primary care provider, you can also send messages to your care team and make appointments. If you have questions, please call your primary care clinic.  If you do not have a primary care provider, please call [phone] and they will assist you.        Care EveryWhere ID     This is your Care EveryWhere ID. This could be used by other organizations to access your [place] medical records  [id number]        Your Vitals Were     BMI (Body Mass Index)                   44.45 kg/m2            Blood Pressure from Last 3 Encounters:   10/11/17 139/89   07/19/17 145/81   05/03/17 134/75    Weight from Last 3 Encounters:   12/27/17 243 lb (110.2 kg)   12/06/17 244 lb (110.7 kg)   10/11/17 243 lb  (110.2 kg)              We Performed the Following     C Medroxyprogesterone inj/1mg     INJECTION INTRAMUSCULAR OR SUB-Q        Primary Care Provider Office Phone # Fax #    Gena Hernandez [phone] [address] [facility] [address]        Equal Access to Services     SHANT [facility] : Hadii aad ku hadasho Soomaali, waaxda luqadaha, qaybta kaalmada adeegyada, waxay idiin hayaan adeeg tedjace valdez . So [place] [phone].    ATENCIÓN: Si habla español, tiene a michael disposición servicios gratuitos de asistencia lingüística. Llame al [phone].    We comply with applicable federal civil rights laws and [place] laws. We do not discriminate on the basis of race, color, national origin, age, disability, sex, sexual orientation, or gender identity.            Thank you!     Thank you for choosing [facility]  for your care. Our goal is always to provide you with excellent care. Hearing back from our patients is one way we can continue to improve our services. Please take a few minutes to complete the written survey that you may receive in the mail after your visit with us. Thank you!             Your Updated Medication List - Protect others around you: Learn how to safely use, store and throw away your medicines at www.disposemymeds.org.          This list is accurate as of: 12/27/17  8:34 AM.  Always use your most recent med list.                   Brand Name Dispense Instructions for use Diagnosis    losartan-hydrochlorothiazide 50-12.5 MG per tablet    HYZAAR    90 tablet    Take 1 tablet by mouth daily    Essential hypertension       medroxyPROGESTERone 150 MG/ML injection    DEPO-PROVERA    1 mL    Inject 1 mL (150 mg) into the muscle every 3 months    Encounter for surveillance of injectable contraceptive       metFORMIN 500 MG 24 hr tablet    GLUCOPHAGE-XR    180 tablet    Take 1 tablet (500 mg) by mouth 2 times daily (with meals)    Type 2 diabetes mellitus without  complication, without long-term current use of insulin (H)       RANITIDINE HCL PO      Take 150 mg by mouth as needed for other (pain in mid section from gall bladder)

## 2018-01-15 ENCOUNTER — TELEPHONE (OUTPATIENT)
Dept: FAMILY MEDICINE | Facility: CLINIC | Age: 48
End: 2018-01-15

## 2018-01-15 NOTE — TELEPHONE ENCOUNTER
Panel Management Review      Patient has the following on her problem list:     Diabetes    ASA: Failed    Last A1C  Lab Results   Component Value Date    A1C 7.9 10/11/2017    A1C 5.5 08/16/2012    A1C 6.7 01/26/2011     A1C tested: Passed    Last LDL:    Lab Results   Component Value Date    CHOL 171 08/10/2012     Lab Results   Component Value Date    HDL 34 08/10/2012     Lab Results   Component Value Date    LDL 91 08/10/2012     Lab Results   Component Value Date    TRIG 227 08/10/2012     Lab Results   Component Value Date    CHOLHDLRATIO 5.0 08/10/2012     No results found for: NHDL    Is the patient on a Statin? NO             Is the patient on Aspirin? NO        Last three blood pressure readings:  BP Readings from Last 3 Encounters:   10/11/17 139/89   07/19/17 145/81   05/03/17 134/75       Date of last diabetes office visit: 10/11/17     Tobacco History:     History   Smoking Status     Never Smoker   Smokeless Tobacco     Never Used     Comment: lives in a smoke free household.         Hypertension   Last three blood pressure readings:  BP Readings from Last 3 Encounters:   10/11/17 139/89   07/19/17 145/81   05/03/17 134/75     Blood pressure: Passed    HTN Guidelines:  Age 18-59 BP range:  Less than 140/90  Age 60-85 with Diabetes:  Less than 140/90  Age 60-85 without Diabetes:  less than 150/90          Composite cancer screening  Chart review shows that this patient is due/due soon for the following None  Summary:    Patient is due/failing the following:   Diabetic check     Action needed:   Patient needs office visit for diabetic labs .    Type of outreach:    [employer]-A message.    Questions for provider review:    None                                                                                                                                    Gabriela Huffman MA       Chart routed to Care Team .

## 2018-01-23 ENCOUNTER — MYC MEDICAL ADVICE (OUTPATIENT)
Dept: FAMILY MEDICINE | Facility: CLINIC | Age: 48
End: 2018-01-23

## 2018-01-23 DIAGNOSIS — E11.9 TYPE 2 DIABETES MELLITUS WITHOUT COMPLICATION, WITHOUT LONG-TERM CURRENT USE OF INSULIN (H): Primary | ICD-10-CM

## 2018-01-23 NOTE — TELEPHONE ENCOUNTER
I would recommend she see diabetes educator to help with med management  She is also overdue for 3 month diab check.   I have placed order for diab ed. They will call her.

## 2018-03-14 ENCOUNTER — ALLIED HEALTH/NURSE VISIT (OUTPATIENT)
Dept: NURSING | Facility: CLINIC | Age: 48
End: 2018-03-14
Payer: COMMERCIAL

## 2018-03-14 VITALS
HEART RATE: 80 BPM | DIASTOLIC BLOOD PRESSURE: 88 MMHG | SYSTOLIC BLOOD PRESSURE: 138 MMHG | BODY MASS INDEX: 42.8 KG/M2 | WEIGHT: 234 LBS

## 2018-03-14 DIAGNOSIS — Z30.42 DEPO-PROVERA CONTRACEPTIVE STATUS: Primary | ICD-10-CM

## 2018-03-14 PROCEDURE — 96372 THER/PROPH/DIAG INJ SC/IM: CPT

## 2018-03-14 PROCEDURE — 99207 ZZC NO CHARGE NURSE ONLY: CPT

## 2018-03-14 NOTE — PROGRESS NOTES
BP: 138/88    LAST PAP/EXAM: Lab Results       Component                Value               Date                       PAP                      NIL                 10/11/2017            URINE HCG:not indicated    The following medication was given:     MEDICATION: Depo Provera 150mg  ROUTE: IM  SITE: Deltoid - Right  : [employer]  LOT #: [id number]  EXP:3/2020  NEXT INJECTION DUE: 5/30/18 - 6/13/18   Provider: PITA Gregory MA

## 2018-03-14 NOTE — MR AVS SNAPSHOT
After Visit Summary   3/14/2018    Adela Ernandez    MRN: [id number]           Patient Information     Date Of Birth          1970        Visit Information        Provider Department      3/14/2018 4:30 PM AN ANCILLARY [facility]        Today's Diagnoses     Depo-Provera contraceptive status    -  1       Follow-ups after your visit        Who to contact     If you have questions or need follow up information about today's clinic visit or your schedule please contact [facility] directly at [phone].  Normal or non-critical lab and imaging results will be communicated to you by Popcorn networkhart, letter or phone within 4 business days after the clinic has received the results. If you do not hear from us within 7 days, please contact the clinic through [employer] or phone. If you have a critical or abnormal lab result, we will notify you by phone as soon as possible.  Submit refill requests through oohilove or call your pharmacy and they will forward the refill request to us. Please allow 3 business days for your refill to be completed.          Additional Information About Your Visit        MyChart Information     oohilove gives you secure access to your electronic health record. If you see a primary care provider, you can also send messages to your care team and make appointments. If you have questions, please call your primary care clinic.  If you do not have a primary care provider, please call [phone] and they will assist you.        Care EveryWhere ID     This is your Care EveryWhere ID. This could be used by other organizations to access your [place] medical records  [id number]        Your Vitals Were     Pulse BMI (Body Mass Index)                80 42.8 kg/m2           Blood Pressure from Last 3 Encounters:   03/14/18 138/88   10/11/17 139/89   07/19/17 145/81    Weight from Last 3 Encounters:   03/14/18 234 lb (106.1 kg)   12/27/17 243 lb (110.2 kg)   12/06/17 244 lb  (110.7 kg)              We Performed the Following     C Medroxyprogesterone inj/1mg     INJECTION INTRAMUSCULAR OR SUB-Q        Primary Care Provider Office Phone # Fax #    Gena Hernandez [phone] [address] [facility] [address]        Equal Access to Services     SHANT CLARK : Hadii aad ku hadasho Soomaali, waaxda luqadaha, qaybta kaalmada adeegyada, waxay idiin hayaan adeeg tedjace lashayla . So [facility] [phone].    ATENCIÓN: Si habla español, tiene a michael disposición servicios gratuitos de asistencia lingüística. Llame al [phone].    We comply with applicable federal civil rights laws and [place] laws. We do not discriminate on the basis of race, color, national origin, age, disability, sex, sexual orientation, or gender identity.            Thank you!     Thank you for choosing [facility]  for your care. Our goal is always to provide you with excellent care. Hearing back from our patients is one way we can continue to improve our services. Please take a few minutes to complete the written survey that you may receive in the mail after your visit with us. Thank you!             Your Updated Medication List - Protect others around you: Learn how to safely use, store and throw away your medicines at www.disposemymeds.org.          This list is accurate as of 3/14/18  4:36 PM.  Always use your most recent med list.                   Brand Name Dispense Instructions for use Diagnosis    losartan-hydrochlorothiazide 50-12.5 MG per tablet    HYZAAR    90 tablet    Take 1 tablet by mouth daily    Essential hypertension       medroxyPROGESTERone 150 MG/ML injection    DEPO-PROVERA    1 mL    Inject 1 mL (150 mg) into the muscle every 3 months    Encounter for surveillance of injectable contraceptive       metFORMIN 500 MG 24 hr tablet    GLUCOPHAGE-XR    180 tablet    Take 1 tablet (500 mg) by mouth 2 times daily (with meals)    Type 2 diabetes mellitus without  complication, without long-term current use of insulin (H)       RANITIDINE HCL PO      Take 150 mg by mouth as needed for other (pain in mid section from gall bladder)

## 2018-04-15 DIAGNOSIS — I10 ESSENTIAL HYPERTENSION: ICD-10-CM

## 2018-04-16 RX ORDER — LOSARTAN POTASSIUM AND HYDROCHLOROTHIAZIDE 12.5; 5 MG/1; MG/1
TABLET ORAL
Qty: 90 TABLET | Refills: 1 | Status: SHIPPED | OUTPATIENT
Start: 2018-04-16 | End: 2018-10-21

## 2018-05-22 ENCOUNTER — TELEPHONE (OUTPATIENT)
Dept: FAMILY MEDICINE | Facility: CLINIC | Age: 48
End: 2018-05-22

## 2018-05-29 ENCOUNTER — OFFICE VISIT (OUTPATIENT)
Dept: FAMILY MEDICINE | Facility: CLINIC | Age: 48
End: 2018-05-29
Payer: COMMERCIAL

## 2018-05-29 VITALS
WEIGHT: 225 LBS | RESPIRATION RATE: 18 BRPM | BODY MASS INDEX: 41.41 KG/M2 | HEART RATE: 88 BPM | TEMPERATURE: 97.1 F | DIASTOLIC BLOOD PRESSURE: 75 MMHG | SYSTOLIC BLOOD PRESSURE: 139 MMHG | OXYGEN SATURATION: 99 % | HEIGHT: 62 IN

## 2018-05-29 DIAGNOSIS — M75.52 SUBDELTOID BURSITIS OF LEFT SHOULDER JOINT: ICD-10-CM

## 2018-05-29 DIAGNOSIS — E11.9 TYPE 2 DIABETES MELLITUS WITHOUT COMPLICATION, WITHOUT LONG-TERM CURRENT USE OF INSULIN (H): Primary | ICD-10-CM

## 2018-05-29 DIAGNOSIS — E66.01 MORBID OBESITY (H): ICD-10-CM

## 2018-05-29 PROCEDURE — 99214 OFFICE O/P EST MOD 30 MIN: CPT | Performed by: FAMILY MEDICINE

## 2018-05-29 ASSESSMENT — PAIN SCALES - GENERAL: PAINLEVEL: MILD PAIN (3)

## 2018-05-29 NOTE — PROGRESS NOTES
"SUBJECTIVE:  Adela Ernandez is a 48 year old female  who is seen for  left shoulder pain that started   3 years ago.   Onset of injury: Following acute injury.  Mechanism of injury:  Fell forward and hit shoulder .  symptoms wentaway and now returnen 6-8 months.  Relieving Factors:  \"icy hot\"   Worsening Factors: lifting arm over the shoulder   Symptoms have been gradual since that time.  Prior history of related problems: no prior problems with this area in the past.    Patients past medical, surgical, social and family histories reviewed.     REVIEW OF SYSTEMS:  CONSTITUTIONAL:NEGATIVE for fever, chills, change in weight    OBJECTIVE:  Blood pressure 139/75, pulse 88, temperature 97.1  F (36.2  C), temperature source Oral, resp. rate 18, height 5' 2\" (1.575 m), weight 225 lb (102.1 kg), SpO2 99 %, not currently breastfeeding.     GENERAL: healthy, alert and no distress  GAIT: normal   SKIN: no suspicious lesions or rashes  NEURO: Normal strength and tone, mentation intact and speech normal  PSYCH:  mentation appears normal and affect normal/bright    MUSCULOSKELETAL:  LEFT SHOULDER  Inspection: no swelling, bruising, discoloration, or obvious deformity or asymmetry  Tender: deltoid   Non-tender: SC joint, proximal-mid clavicle, mid-distal clavicle, AC joint, acromion, anterior capsule, proximal bicep tendon, greater tuberosity, proximal humerus and supraspinatus   Range of Motion  Active:all normal  Passive: all normal  Strength: rotator cuff strength full  Neurovascularly Intact Distally.      ICD-10-CM    1. Type 2 diabetes mellitus without complication, without long-term current use of insulin (H) E11.9    2. Subdeltoid bursitis of left shoulder joint M75.52 ORTHOPEDICS ADULT REFERRAL   3. Morbid obesity (H) E66.01     PLAN:trial of exercise  Follow up Dr. Marcos  Lose weight   "

## 2018-05-29 NOTE — NURSING NOTE
"Chief Complaint   Patient presents with     Shoulder Pain     left shoulder pain x 8 months - progressively getting worse - decrease ROM       Initial /77  Pulse 88  Temp 97.1  F (36.2  C) (Oral)  Resp 18  Ht 5' 2\" (1.575 m)  Wt 225 lb (102.1 kg)  SpO2 99%  BMI 41.15 kg/m2 Estimated body mass index is 41.15 kg/(m^2) as calculated from the following:    Height as of this encounter: 5' 2\" (1.575 m).    Weight as of this encounter: 225 lb (102.1 kg).  Medication Reconciliation: complete  Mihaela Kumar M.A.    "

## 2018-05-29 NOTE — MR AVS SNAPSHOT
After Visit Summary   5/29/2018    Adela Ernandez    MRN: [id number]           Patient Information     Date Of Birth          1970        Visit Information        Provider Department      5/29/2018 4:00 PM Chris Kirk MD [facility]        Today's Diagnoses     Type 2 diabetes mellitus without complication, without long-term current use of insulin (H)    -  1    Subdeltoid bursitis of left shoulder joint        Morbid obesity (H)          Care Instructions    [facility] - web site shoulder bursitis  rehab excercises          Follow-ups after your visit        Additional Services     ORTHOPEDICS ADULT REFERRAL       Your provider has referred you to: [facility]: [facility] [phone]   [email or web address]    Please be aware that coverage of these services is subject to the terms and limitations of your health insurance plan.  Call member services at your health plan with any benefit or coverage questions.      Please bring the following to your appointment:    >>   Any x-rays, CTs or MRIs which have been performed.  Contact the facility where they were done to arrange for  prior to your scheduled appointment.    >>   List of current medications   >>   This referral request   >>   Any documents/labs given to you for this referral                  Your next 10 appointments already scheduled     May 30, 2018  4:45 PM CDT   Nurse Only with VINEET TOMAS   [facility] ([facility])    [address] [facility] [address] [phone]              Who to contact     If you have questions or need follow up information about today's clinic visit or your schedule please contact [facility] directly at [phone].  Normal or non-critical lab and imaging results will be communicated to you by MyChart, letter or phone within 4 business days after the clinic has received the results.  "If you do not hear from us within 7 days, please contact the clinic through Ensenda or phone. If you have a critical or abnormal lab result, we will notify you by phone as soon as possible.  Submit refill requests through Ensenda or call your pharmacy and they will forward the refill request to us. Please allow 3 business days for your refill to be completed.          Additional Information About Your Visit        Melody ManagementharPixelated Information     Ensenda gives you secure access to your electronic health record. If you see a primary care provider, you can also send messages to your care team and make appointments. If you have questions, please call your primary care clinic.  If you do not have a primary care provider, please call [phone] and they will assist you.        Care EveryWhere ID     This is your Care EveryWhere ID. This could be used by other organizations to access your [place] medical records  [id number]        Your Vitals Were     Pulse Temperature Respirations Height Pulse Oximetry BMI (Body Mass Index)    88 97.1  F (36.2  C) (Oral) 18 5' 2\" (1.575 m) 99% 41.15 kg/m2       Blood Pressure from Last 3 Encounters:   05/29/18 139/75   03/14/18 138/88   10/11/17 139/89    Weight from Last 3 Encounters:   05/29/18 225 lb (102.1 kg)   03/14/18 234 lb (106.1 kg)   12/27/17 243 lb (110.2 kg)              We Performed the Following     ORTHOPEDICS ADULT REFERRAL        Primary Care Provider Office Phone # Fax #    Gena Hernandez [phone] [address] [address] [facility] [address]        Equal Access to Services     [facility]: Hadii aad ku hadasho Soomaali, waaxda luqadaha, qaybta kaalmada naye, rolf johnson. So [facility] [phone].    ATENCIÓN: Si habla español, tiene a michael disposición servicios gratuitos de asistencia lingüística. Carolinaame al [phone].    We comply with applicable federal civil rights laws and [place] laws. We do not discriminate on the " basis of race, color, national origin, age, disability, sex, sexual orientation, or gender identity.            Thank you!     Thank you for choosing [facility]  [facility]. Our goal is always to provide you with excellent care. Hearing back from our patients is one way we can continue to improve our services. Please take a few minutes to complete the written survey that you may receive in the mail after your visit with us. Thank you!             Your Updated Medication List - Protect others around you: Learn how to safely use, store and throw away your medicines at www.disposemymeds.org.          This list is accurate as of 5/29/18  4:52 PM.  Always use your most recent med list.                   Brand Name Dispense Instructions for use Diagnosis    losartan-hydrochlorothiazide 50-12.5 MG per tablet    HYZAAR    90 tablet    TAKE 1 TABLET BY MOUTH DAILY    Essential hypertension       medroxyPROGESTERone 150 MG/ML injection    DEPO-PROVERA    1 mL    Inject 1 mL (150 mg) into the muscle every 3 months    Encounter for surveillance of injectable contraceptive       RANITIDINE HCL PO      Take 150 mg by mouth as needed for other (pain in mid section from gall bladder)

## 2018-05-30 ENCOUNTER — ALLIED HEALTH/NURSE VISIT (OUTPATIENT)
Dept: NURSING | Facility: CLINIC | Age: 48
End: 2018-05-30
Payer: COMMERCIAL

## 2018-05-30 DIAGNOSIS — Z30.42 SURVEILLANCE FOR DEPO-PROVERA CONTRACEPTION: Primary | ICD-10-CM

## 2018-05-30 PROCEDURE — 99207 ZZC NO CHARGE NURSE ONLY: CPT

## 2018-05-30 PROCEDURE — 96372 THER/PROPH/DIAG INJ SC/IM: CPT

## 2018-05-30 NOTE — NURSING NOTE
>> Mali Garrison MA   5/30/2018  4:53 PM  BP: Data Unavailable    LAST PAP/EXAM: Lab Results       Component                Value               Date                       PAP                      NIL                 10/11/2017            URINE HCG:not indicated    The following medication wa  s given:     MEDICATION: Depo Provera 150mg  ROUTE: IM  SITE: Deltoid - Right  : [employer]   LOT #: [id number]  EXP:4/19  NEXT INJECTION DUE: 8/15/18 - 8/29/18   Provider: Gena Garrison M.A.

## 2018-05-30 NOTE — MR AVS SNAPSHOT
After Visit Summary   5/30/2018    Adela Ernandez    MRN: [id number]           Patient Information     Date Of Birth          1970        Visit Information        Provider [facility]      [facility]        Today's Diagnoses     Surveillance for Depo-Provera contraception    -  1       Follow-ups after your visit        Who to contact     If you have questions or need follow up information about today's clinic visit or your schedule please contact [facility] directly at [phone].  Normal or non-critical lab and imaging results will be communicated to you by MyChart, letter or phone within 4 business days after the clinic has received the results. If you do not hear from us within 7 days, please contact the clinic through Incentivet or phone. If you have a critical or abnormal lab result, we will notify you by phone as soon as possible.  Submit refill requests through [employer] or call your pharmacy and they will forward the refill request to us. Please allow 3 business days for your refill to be completed.          Additional Information About Your Visit        MyChart Information     [employer] Systems gives you secure access to your electronic health record. If you see a primary care provider, you can also send messages to your care team and make appointments. If you have questions, please call your primary care clinic.  If you do not have a primary care provider, please call [phone] and they will assist you.        Care EveryWhere ID     This is your Care EveryWhere ID. This could be used by other organizations to access your [place] medical records  [id number]         Blood Pressure from Last 3 Encounters:   05/29/18 139/75   03/14/18 138/88   10/11/17 139/89    Weight from Last 3 Encounters:   05/29/18 225 lb (102.1 kg)   03/14/18 234 lb (106.1 kg)   12/27/17 243 lb (110.2 kg)              Today, you had the following     No orders found  for display       Primary Care Provider Office Phone # Fax #    Gena Hernandez [phone] [address] [facility] [address]        Equal Access to Services     SHANT CLARK : Maik silvestre gavino Soaleahali, waaxda luqadaha, qaybta kaalmada adedanita, rolf winkler laAkinmeli johnson. So [facility] [phone].    ATENCIÓN: Si habla español, tiene a michael disposición servicios gratuitos de asistencia lingüística. Llame al [phone].    We comply with applicable federal civil rights laws and [place] laws. We do not discriminate on the basis of race, color, national origin, age, disability, sex, sexual orientation, or gender identity.            Thank you!     Thank you for choosing [facility]  for your care. Our goal is always to provide you with excellent care. Hearing back from our patients is one way we can continue to improve our services. Please take a few minutes to complete the written survey that you may receive in the mail after your visit with us. Thank you!             Your Updated Medication List - Protect others around you: Learn how to safely use, store and throw away your medicines at www.disposemymeds.org.          This list is accurate as of 5/30/18  4:58 PM.  Always use your most recent med list.                   Brand Name Dispense Instructions for use Diagnosis    losartan-hydrochlorothiazide 50-12.5 MG per tablet    HYZAAR    90 tablet    TAKE 1 TABLET BY MOUTH DAILY    Essential hypertension       medroxyPROGESTERone 150 MG/ML injection    DEPO-PROVERA    1 mL    Inject 1 mL (150 mg) into the muscle every 3 months    Encounter for surveillance of injectable contraceptive       RANITIDINE HCL PO      Take 150 mg by mouth as needed for other (pain in mid section from gall bladder)

## 2018-06-07 ENCOUNTER — TELEPHONE (OUTPATIENT)
Dept: FAMILY MEDICINE | Facility: CLINIC | Age: 48
End: 2018-06-07

## 2018-06-07 NOTE — TELEPHONE ENCOUNTER
Panel Management Review      Patient has the following on her problem list:     Diabetes    ASA: Not Required     Last A1C  Lab Results   Component Value Date    A1C 7.9 10/11/2017    A1C 5.5 08/16/2012    A1C 6.7 01/26/2011     A1C tested: Passed    Last LDL:    Lab Results   Component Value Date    CHOL 171 08/10/2012     Lab Results   Component Value Date    HDL 34 08/10/2012     Lab Results   Component Value Date    LDL 91 08/10/2012     Lab Results   Component Value Date    TRIG 227 08/10/2012     Lab Results   Component Value Date    CHOLHDLRATIO 5.0 08/10/2012     No results found for: NHDL    Is the patient on a Statin? NO             Is the patient on Aspirin? NO        Last three blood pressure readings:  BP Readings from Last 3 Encounters:   05/29/18 139/75   03/14/18 138/88   10/11/17 139/89       Date of last diabetes office visit: 5/29/18 with Dr Kirk     Tobacco History:     History   Smoking Status     Never Smoker   Smokeless Tobacco     Never Used     Comment: lives in a smoke free household.         Hypertension   Last three blood pressure readings:  BP Readings from Last 3 Encounters:   05/29/18 139/75   03/14/18 138/88   10/11/17 139/89     Blood pressure: Passed    HTN Guidelines:  Age 18-59 BP range:  Less than 140/90  Age 60-85 with Diabetes:  Less than 140/90  Age 60-85 without Diabetes:  less than 150/90      Composite cancer screening  Chart review shows that this patient is due/due soon for the following None  Summary:    Patient is due/failing the following:   Diabetic labs and eye exam    Action needed:   Routed to provider for review.    Type of outreach:    None, routed to provider for review.    Questions for provider review:    Was just seen on 5/29/18 and no labs were done. Please address                                                                                                                                    Gabriela Huffman MA       Chart routed to Provider  .

## 2018-08-15 ENCOUNTER — ALLIED HEALTH/NURSE VISIT (OUTPATIENT)
Dept: NURSING | Facility: CLINIC | Age: 48
End: 2018-08-15
Payer: COMMERCIAL

## 2018-08-15 VITALS
BODY MASS INDEX: 38.96 KG/M2 | WEIGHT: 213 LBS | HEART RATE: 82 BPM | DIASTOLIC BLOOD PRESSURE: 80 MMHG | SYSTOLIC BLOOD PRESSURE: 138 MMHG

## 2018-08-15 DIAGNOSIS — Z30.42 DEPO-PROVERA CONTRACEPTIVE STATUS: Primary | ICD-10-CM

## 2018-08-15 PROCEDURE — 99207 ZZC NO CHARGE NURSE ONLY: CPT

## 2018-08-15 PROCEDURE — 96372 THER/PROPH/DIAG INJ SC/IM: CPT

## 2018-08-15 NOTE — PROGRESS NOTES
BP: 138/80    LAST PAP/EXAM: Lab Results       Component                Value               Date                       PAP                      NIL                 10/11/2017            URINE HCG:not indicated    The following medication was given:     MEDICATION: Depo Provera 150mg  ROUTE: IM  SITE: Arm - Left  : [employer]  LOT #: [id number]  EXP:2020  NEXT INJECTION DUE: 10/31/18 - 18   Provider: emerald Gregory MA    Patient informed orders  10/11/2018, will need to be seen.  Elzbieta Gregory MA

## 2018-08-15 NOTE — MR AVS SNAPSHOT
After Visit Summary   8/15/2018    Adela Ernandez    MRN: [id number]           Patient Information     Date Of Birth          1970        Visit Information        Provider Department      8/15/2018 4:15 PM AN ANCILLARY [facility]        Today's Diagnoses     Depo-Provera contraceptive status    -  1       Follow-ups after your visit        Who to contact     If you have questions or need follow up information about today's clinic visit or your schedule please contact [facility] directly at [phone].  Normal or non-critical lab and imaging results will be communicated to you by JNJ Mobilehart, letter or phone within 4 business days after the clinic has received the results. If you do not hear from us within 7 days, please contact the clinic through Electrolytic Ozonet or phone. If you have a critical or abnormal lab result, we will notify you by phone as soon as possible.  Submit refill requests through TigerTrade or call your pharmacy and they will forward the refill request to us. Please allow 3 business days for your refill to be completed.          Additional Information About Your Visit        MyChart Information     TigerTrade gives you secure access to your electronic health record. If you see a primary care provider, you can also send messages to your care team and make appointments. If you have questions, please call your primary care clinic.  If you do not have a primary care provider, please call [phone] and they will assist you.        Care EveryWhere ID     This is your Care EveryWhere ID. This could be used by other organizations to access your [place] medical records  [id number]        Your Vitals Were     Pulse BMI (Body Mass Index)                82 38.96 kg/m2           Blood Pressure from Last 3 Encounters:   08/15/18 138/80   05/29/18 139/75   03/14/18 138/88    Weight from Last 3 Encounters:   08/15/18 213 lb (96.6 kg)   05/29/18 225 lb (102.1 kg)   03/14/18 234 lb  (106.1 kg)              We Performed the Following     C Medroxyprogesterone inj/1mg     INJECTION INTRAMUSCULAR OR SUB-Q        Primary Care Provider Office Phone # Fax #    Gena Hernandez [phone] [address] [facility] [address]        Equal Access to Services     SHANT CLARK : Hadii aad ku hadasho Soomaali, waaxda luqadaha, qaybta kaalmada adeegyada, waxay idiin hayaan adeeg tdeajce lashayla . So [facility] [phone].    ATENCIÓN: Si habla español, tiene a michael disposición servicios gratuitos de asistencia lingüística. Llame al [phone].    We comply with applicable federal civil rights laws and [place] laws. We do not discriminate on the basis of race, color, national origin, age, disability, sex, sexual orientation, or gender identity.            Thank you!     Thank you for choosing [facility]  for your care. Our goal is always to provide you with excellent care. Hearing back from our patients is one way we can continue to improve our services. Please take a few minutes to complete the written survey that you may receive in the mail after your visit with us. Thank you!             Your Updated Medication List - Protect others around you: Learn how to safely use, store and throw away your medicines at www.disposemymeds.org.          This list is accurate as of 8/15/18  4:29 PM.  Always use your most recent med list.                   Brand Name Dispense Instructions for use Diagnosis    losartan-hydrochlorothiazide 50-12.5 MG per tablet    HYZAAR    90 tablet    TAKE 1 TABLET BY MOUTH DAILY    Essential hypertension       medroxyPROGESTERone 150 MG/ML injection    DEPO-PROVERA    1 mL    Inject 1 mL (150 mg) into the muscle every 3 months    Encounter for surveillance of injectable contraceptive       RANITIDINE HCL PO      Take 150 mg by mouth as needed for other (pain in mid section from gall bladder)

## 2018-10-21 DIAGNOSIS — I10 ESSENTIAL HYPERTENSION: ICD-10-CM

## 2018-10-22 RX ORDER — LOSARTAN POTASSIUM AND HYDROCHLOROTHIAZIDE 12.5; 5 MG/1; MG/1
TABLET ORAL
Qty: 30 TABLET | Refills: 0 | Status: SHIPPED | OUTPATIENT
Start: 2018-10-22 | End: 2018-10-30

## 2018-10-30 ASSESSMENT — ENCOUNTER SYMPTOMS
HEARTBURN: 0
COUGH: 0
PARESTHESIAS: 0
EYE PAIN: 0
HEMATOCHEZIA: 0
NAUSEA: 0
WEAKNESS: 0
HEMATURIA: 0
DIZZINESS: 0
ARTHRALGIAS: 0
SORE THROAT: 0
PALPITATIONS: 0
DYSURIA: 0
FREQUENCY: 1
SHORTNESS OF BREATH: 0
BREAST MASS: 0
ABDOMINAL PAIN: 0
CONSTIPATION: 0
DIARRHEA: 0
HEADACHES: 1
MYALGIAS: 0
CHILLS: 0
JOINT SWELLING: 0
FEVER: 0
NERVOUS/ANXIOUS: 0

## 2018-10-31 ENCOUNTER — OFFICE VISIT (OUTPATIENT)
Dept: FAMILY MEDICINE | Facility: CLINIC | Age: 48
End: 2018-10-31
Payer: COMMERCIAL

## 2018-10-31 VITALS
HEIGHT: 62 IN | TEMPERATURE: 98.1 F | DIASTOLIC BLOOD PRESSURE: 73 MMHG | WEIGHT: 214 LBS | RESPIRATION RATE: 19 BRPM | BODY MASS INDEX: 39.38 KG/M2 | SYSTOLIC BLOOD PRESSURE: 135 MMHG | OXYGEN SATURATION: 96 % | HEART RATE: 99 BPM

## 2018-10-31 DIAGNOSIS — E11.9 TYPE 2 DIABETES MELLITUS WITHOUT COMPLICATION, WITHOUT LONG-TERM CURRENT USE OF INSULIN (H): ICD-10-CM

## 2018-10-31 DIAGNOSIS — E78.1 HIGH TRIGLYCERIDES: ICD-10-CM

## 2018-10-31 DIAGNOSIS — Z30.42 ENCOUNTER FOR SURVEILLANCE OF INJECTABLE CONTRACEPTIVE: ICD-10-CM

## 2018-10-31 DIAGNOSIS — Z00.00 ROUTINE HISTORY AND PHYSICAL EXAMINATION OF ADULT: Primary | ICD-10-CM

## 2018-10-31 DIAGNOSIS — Z12.31 ENCOUNTER FOR SCREENING MAMMOGRAM FOR BREAST CANCER: ICD-10-CM

## 2018-10-31 DIAGNOSIS — I10 ESSENTIAL HYPERTENSION: ICD-10-CM

## 2018-10-31 DIAGNOSIS — E66.01 MORBID OBESITY (H): ICD-10-CM

## 2018-10-31 LAB — HBA1C MFR BLD: 6.5 % (ref 0–5.6)

## 2018-10-31 PROCEDURE — 99396 PREV VISIT EST AGE 40-64: CPT | Performed by: FAMILY MEDICINE

## 2018-10-31 PROCEDURE — 99207 C FOOT EXAM  NO CHARGE: CPT | Mod: 25 | Performed by: FAMILY MEDICINE

## 2018-10-31 PROCEDURE — 99214 OFFICE O/P EST MOD 30 MIN: CPT | Mod: 25 | Performed by: FAMILY MEDICINE

## 2018-10-31 PROCEDURE — 82043 UR ALBUMIN QUANTITATIVE: CPT | Performed by: FAMILY MEDICINE

## 2018-10-31 PROCEDURE — 36415 COLL VENOUS BLD VENIPUNCTURE: CPT | Performed by: FAMILY MEDICINE

## 2018-10-31 PROCEDURE — 83036 HEMOGLOBIN GLYCOSYLATED A1C: CPT | Performed by: FAMILY MEDICINE

## 2018-10-31 PROCEDURE — 84443 ASSAY THYROID STIM HORMONE: CPT | Performed by: FAMILY MEDICINE

## 2018-10-31 PROCEDURE — 80053 COMPREHEN METABOLIC PANEL: CPT | Performed by: FAMILY MEDICINE

## 2018-10-31 RX ORDER — MEDROXYPROGESTERONE ACETATE 150 MG/ML
150 INJECTION, SUSPENSION INTRAMUSCULAR
Qty: 1 ML | Refills: 3 | OUTPATIENT
Start: 2018-10-31 | End: 2021-03-04

## 2018-10-31 RX ORDER — LOSARTAN POTASSIUM AND HYDROCHLOROTHIAZIDE 12.5; 5 MG/1; MG/1
1 TABLET ORAL DAILY
Qty: 90 TABLET | Refills: 1 | Status: SHIPPED | OUTPATIENT
Start: 2018-10-31 | End: 2019-05-25

## 2018-10-31 ASSESSMENT — ENCOUNTER SYMPTOMS
BREAST MASS: 0
PARESTHESIAS: 0
CHILLS: 0
ABDOMINAL PAIN: 0
FREQUENCY: 1
HEMATOCHEZIA: 0
FEVER: 0
DYSURIA: 0
PALPITATIONS: 0
DIARRHEA: 0
SORE THROAT: 0
EYE PAIN: 0
HEARTBURN: 0
DIZZINESS: 0
SHORTNESS OF BREATH: 0
JOINT SWELLING: 0
WEAKNESS: 0
NAUSEA: 0
HEADACHES: 1
MYALGIAS: 0
COUGH: 0
HEMATURIA: 0
ARTHRALGIAS: 0
NERVOUS/ANXIOUS: 0
CONSTIPATION: 0

## 2018-10-31 NOTE — PROGRESS NOTES
SUBJECTIVE:   CC: Adela Ernandez is an 48 year old woman who presents for preventive health visit.     Physical   Annual:     Getting at least 3 servings of Calcium per day:  Yes    Bi-annual eye exam:  Yes    Dental care twice a year:  Yes    Sleep apnea or symptoms of sleep apnea:  None    Diet:  Regular (no restrictions)    Frequency of exercise:  4-5 days/week    Duration of exercise:  45-60 minutes    Taking medications regularly:  Yes    Medication side effects:  None    Additional concerns today:  No    Pt with diabetes, has now lost weight over the last year and increased her exercise. She would like to recheck her A1c  She is on at statin/ace and aspirin        Today's PHQ-2 Score:   PHQ-2 ( 1999 Pf[employer]) 10/30/2018   Q1: Little interest or pleasure in doing things 0   Q2: Feeling down, depressed or hopeless 0   PHQ-2 Score 0   Q1: Little interest or pleasure in doing things Not at all   Q2: Feeling down, depressed or hopeless Not at all   PHQ-2 Score 0       Abuse: Current or Past(Physical, Sexual or Emotional)- No  Do you feel safe in your environment - Yes    Social History   Substance Use Topics     Smoking status: Never Smoker     Smokeless tobacco: Never Used      Comment: lives in a smoke free household.     Alcohol use 0.0 oz/week     0 Standard drinks or equivalent per week      Comment: rare     Alcohol Use 10/30/2018   If you drink alcohol do you typically have greater than 3 drinks per day OR greater than 7 drinks per week? No       Reviewed orders with patient.  Reviewed health maintenance and updated orders accordingly - Yes  Labs reviewed in EPIC    Patient under age 50, mutual decision reflected in health maintenance.      Pertinent mammograms are reviewed under the imaging tab.  History of abnormal Pap smear: NO - age 30-65 PAP every 5 years with negative HPV co-testing recommended  PAP / HPV Latest Ref Rng & Units 10/11/2017 1/23/2015 8/10/2012   PAP - NIL Negative NIL   HPV 16 DNA  NEG:Negative Negative - -   HPV 18 DNA NEG:Negative Negative - -   OTHER HR HPV NEG:Negative Negative - -     Reviewed and updated as needed this visit by clinical staff  Tobacco  Allergies  Meds  Med Hx  Surg Hx  Fam Hx  Soc Hx        Reviewed and updated as needed this visit by Provider            Review of Systems   Constitutional: Negative for chills and fever.   HENT: Negative for congestion, ear pain, hearing loss and sore throat.    Eyes: Negative for pain and visual disturbance.   Respiratory: Negative for cough and shortness of breath.    Cardiovascular: Negative for chest pain and palpitations.   Gastrointestinal: Negative for abdominal pain, constipation, diarrhea, heartburn, hematochezia and nausea.   Breasts:  Negative for tenderness, breast mass and discharge.   Genitourinary: Positive for frequency. Negative for dysuria, genital sores, hematuria, pelvic pain, urgency, vaginal bleeding and vaginal discharge.   Musculoskeletal: Negative for arthralgias, joint swelling and myalgias.   Skin: Negative for rash.   Neurological: Positive for headaches. Negative for dizziness, weakness and paresthesias.   Psychiatric/Behavioral: Negative for mood changes. The patient is not nervous/anxious.      CONSTITUTIONAL: NEGATIVE for fever, chills, change in weight  INTEGUMENTARU/SKIN: NEGATIVE for worrisome rashes, moles or lesions  EYES: NEGATIVE for vision changes or irritation  ENT: NEGATIVE for ear, mouth and throat problems  RESP: NEGATIVE for significant cough or SOB  BREAST: NEGATIVE for masses, tenderness or discharge  CV: NEGATIVE for chest pain, palpitations or peripheral edema  GI: NEGATIVE for nausea, abdominal pain, heartburn, or change in bowel habits  : NEGATIVE for unusual urinary or vaginal symptoms. Periods are regular.  MUSCULOSKELETAL: NEGATIVE for significant arthralgias or myalgia  NEURO: NEGATIVE for weakness, dizziness or paresthesias  PSYCHIATRIC: NEGATIVE for changes in mood or  "affect     OBJECTIVE:   /73  Pulse 99  Temp 98.1  F (36.7  C) (Oral)  Resp 19  Ht 5' 2\" (1.575 m)  Wt 214 lb (97.1 kg)  SpO2 96%  BMI 39.14 kg/m2  Physical Exam  GENERAL: healthy, alert and no distress  EYES: Eyes grossly normal to inspection, PERRL and conjunctivae and sclerae normal  HENT: ear canals and TM's normal, nose and mouth without ulcers or lesions  NECK: no adenopathy, no asymmetry, masses, or scars and thyroid normal to palpation  RESP: lungs clear to auscultation - no rales, rhonchi or wheezes  BREAST: normal without masses, tenderness or nipple discharge and no palpable axillary masses or adenopathy  CV: regular rate and rhythm, normal S1 S2, no S3 or S4, no murmur, click or rub, no peripheral edema and peripheral pulses strong  ABDOMEN: soft, nontender, no hepatosplenomegaly, no masses and bowel sounds normal  MS: no gross musculoskeletal defects noted, no edema  SKIN: no suspicious lesions or rashes  NEURO: Normal strength and tone, mentation intact and speech normal  PSYCH: mentation appears normal, affect normal/bright    Diagnostic Test Results:  none     ASSESSMENT/PLAN:   1. Routine history and physical examination of adult      2. Type 2 diabetes mellitus without complication, without long-term current use of insulin (H)  Not seen for about one year.  Has lost weight. Will get her up to date on labs.  Follow-up in 3-6 months depending on restuls  - Hemoglobin A1c  - Albumin Random Urine Quantitative with Creat Ratio  - Comprehensive metabolic panel  - TSH with free T4 reflex  - FOOT EXAM    3. Morbid obesity (H)  Improving, has had weight loss    4. Essential hypertension  At goal on meds  - losartan-hydrochlorothiazide (HYZAAR) 50-12.5 MG per tablet; Take 1 tablet by mouth daily  Dispense: 90 tablet; Refill: 1    5. High triglycerides  On statine    6. Encounter for surveillance of injectable contraceptive  She is due, it is working well, follow-up as needed  - " "medroxyPROGESTERone (DEPO-PROVERA) 150 MG/ML injection; Inject 1 mL (150 mg) into the muscle every 3 months  Dispense: 1 mL; Refill: 3    7. Encounter for screening mammogram for breast cancer  Pt to scheduled  - MA Screening Digital Bilateral; Future    COUNSELING:  Reviewed preventive health counseling, as reflected in patient instructions       Regular exercise       Healthy diet/nutrition       Vision screening       Contraception    BP Readings from Last 1 Encounters:   10/31/18 135/73     Estimated body mass index is 39.14 kg/(m^2) as calculated from the following:    Height as of this encounter: 5' 2\" (1.575 m).    Weight as of this encounter: 214 lb (97.1 kg).      Weight management plan: Discussed healthy diet and exercise guidelines and patient will follow up in 12 months in clinic to re-evaluate.     reports that she has never smoked. She has never used smokeless tobacco.      Counseling Resources:  ATP IV Guidelines  Pooled Cohorts Equation Calculator  Breast Cancer Risk Calculator  FRAX Risk Assessment  ICSI Preventive Guidelines  Dietary Guidelines for Americans, 2010  USDA's MyPlate  ASA Prophylaxis  Lung CA Screening    Gena Marcos MD  [facility]  Answers for HPI/ROS submitted by the patient on 10/30/2018   PHQ-2 Score: 0    "

## 2018-10-31 NOTE — MR AVS SNAPSHOT
After Visit Summary   10/31/2018    Adela Ernandez    MRN: [id number]           Patient Information     Date Of Birth          1970        Visit Information        Provider Department      10/31/2018 4:20 PM Gena Hernandez MD [facility]        Today's Diagnoses     Routine history and physical examination of adult    -  1    Type 2 diabetes mellitus without complication, without long-term current use of insulin (H)        Morbid obesity (H)        Essential hypertension        High triglycerides        Encounter for surveillance of injectable contraceptive        Encounter for screening mammogram for breast cancer          Care Instructions      Preventive Health Recommendations  Female Ages 40 to 49    Yearly exam:     See your health care provider every year in order to  1. Review health changes.   2. Discuss preventive care.    3. Review your medicines if your doctor prescribed any.      Get a Pap test every three years (unless you have an abnormal result and your provider advises testing more often).      If you get Pap tests with HPV test, you only need to test every 5 years, unless you have an abnormal result. You do not need a Pap test if your uterus was removed (hysterectomy) and you have not had cancer.      You should be tested each year for STDs (sexually transmitted diseases), if you're at risk.     Ask your doctor if you should have a mammogram.      Have a colonoscopy (test for colon cancer) if someone in your family has had colon cancer or polyps before age 50.       Have a cholesterol test every 5 years.       Have a diabetes test (fasting glucose) after age 45. If you are at risk for diabetes, you should have this test every 3 years.    Shots: Get a flu shot each year. Get a tetanus shot every 10 years.     Nutrition:     Eat at least 5 servings of fruits and vegetables each day.    Eat whole-grain bread, whole-wheat pasta and brown rice instead of white grains and  rice.    Get adequate Calcium and Vitamin D.      Lifestyle    Exercise at least 150 minutes a week (an average of 30 minutes a day, 5 days a week). This will help you control your weight and prevent disease.    Limit alcohol to one drink per day.    No smoking.     Wear sunscreen to prevent skin cancer.    See your dentist every six months for an exam and cleaning.          Follow-ups after your visit        Follow-up notes from your care team     Return in about 6 months (around 4/30/2019) for Diabetes.      Your next 10 appointments already scheduled     Nov 07, 2018  3:10 PM CST   Nurse Only with AN ANCILLARY   [facility]    [address] [facility] [address] [phone]              Future tests that were ordered for you today     Open Future Orders        Priority Expected Expires Ordered    MA Screening Digital Bilateral Routine  10/31/2019 10/31/2018            Who to contact     If you have questions or need follow up information about today's clinic visit or your schedule please contact [facility] directly at [phone].  Normal or non-critical lab and imaging results will be communicated to you by [email or web address], letter or phone within 4 business days after the clinic has received the results. If you do not hear from us within 7 days, please contact the clinic through [email or web address] or phone. If you have a critical or abnormal lab result, we will notify you by phone as soon as possible.  Submit refill requests through WeVue or call your pharmacy and they will forward the refill request to us. Please allow 3 business days for your refill to be completed.          Additional Information About Your Visit        WeVue Information     WeVue gives you secure access to your electronic health record. If you see a primary care provider, you can also send messages to your care team and make appointments. If you have questions, please call your primary  "care clinic.  If you do not have a primary care provider, please call [phone] and they will assist you.        Care EveryWhere ID     This is your Care EveryWhere ID. This could be used by other organizations to access your [place] medical records  [id number]        Your Vitals Were     Pulse Temperature Respirations Height Pulse Oximetry BMI (Body Mass Index)    99 98.1  F (36.7  C) (Oral) 19 5' 2\" (1.575 m) 96% 39.14 kg/m2       Blood Pressure from Last 3 Encounters:   10/31/18 135/73   08/15/18 138/80   05/29/18 139/75    Weight from Last 3 Encounters:   10/31/18 214 lb (97.1 kg)   08/15/18 213 lb (96.6 kg)   05/29/18 225 lb (102.1 kg)              We Performed the Following     Albumin Random Urine Quantitative with Creat Ratio     Comprehensive metabolic panel     FOOT EXAM     Hemoglobin A1c     OFFICE/OUTPT VISIT,EST,LEVL III     TSH with free T4 reflex          Where to get your medicines      These medications were sent to [facility] [id number] [place] [address] [address]  AT SEC OF [facility] [employer] [place]  [address] [facility] [address]     Phone:  [phone]     losartan-hydrochlorothiazide 50-12.5 MG per tablet         Some of these will need a paper prescription and others can be bought over the counter.  Ask your nurse if you have questions.     You don't need a prescription for these medications     medroxyPROGESTERone 150 MG/ML injection          Primary Care Provider Office Phone # Fax #    Gena Hernandez [phone] [address] [facility] [address]        Equal Access to Services     SHANT CLARK AH: Maik Suh, wakelly roe, qaybta kaalmada naye, rolf johnson. So [facility] [phone].    ATENCIÓN: Si habla español, tiene a michael disposición servicios gratuitos de asistencia lingüística. Llame al [phone].    We comply with applicable federal civil rights laws and [place] laws. " We do not discriminate on the basis of race, color, national origin, age, disability, sex, sexual orientation, or gender identity.            Thank you!     [facility] you [facility]  [facility]. Our goal is always to provide you with excellent care. Hearing back from our patients is one way we can continue to improve our services. Please take a few minutes to complete the written survey that you may receive in the mail after your visit with us. Thank you!             Your Updated Medication List - Protect others around you: Learn how to safely use, store and throw away your medicines at www.disposemymeds.org.          This list is accurate as of 10/31/18 11:59 PM.  Always use your most recent med list.                   Brand Name Dispense Instructions for use Diagnosis    losartan-hydrochlorothiazide 50-12.5 MG per tablet    HYZAAR    90 tablet    Take 1 tablet by mouth daily    Essential hypertension       medroxyPROGESTERone 150 MG/ML injection    DEPO-PROVERA    1 mL    Inject 1 mL (150 mg) into the muscle every 3 months    Encounter for surveillance of injectable contraceptive       RANITIDINE HCL PO      Take 150 mg by mouth as needed for other (pain in mid section from gall bladder)

## 2018-11-01 LAB
ALBUMIN SERPL-MCNC: 3.7 G/DL (ref 3.4–5)
ALP SERPL-CCNC: 86 U/L (ref 40–150)
ALT SERPL W P-5'-P-CCNC: 30 U/L (ref 0–50)
ANION GAP SERPL CALCULATED.3IONS-SCNC: 7 MMOL/L (ref 3–14)
AST SERPL W P-5'-P-CCNC: 18 U/L (ref 0–45)
BILIRUB SERPL-MCNC: 0.6 MG/DL (ref 0.2–1.3)
BUN SERPL-MCNC: 11 MG/DL (ref 7–30)
CALCIUM SERPL-MCNC: 8.6 MG/DL (ref 8.5–10.1)
CHLORIDE SERPL-SCNC: 103 MMOL/L (ref 94–109)
CO2 SERPL-SCNC: 29 MMOL/L (ref 20–32)
CREAT SERPL-MCNC: 0.65 MG/DL (ref 0.52–1.04)
CREAT UR-MCNC: 72 MG/DL
GFR SERPL CREATININE-BSD FRML MDRD: >90 ML/MIN/1.7M2
GLUCOSE SERPL-MCNC: 127 MG/DL (ref 70–99)
MICROALBUMIN UR-MCNC: <5 MG/L
MICROALBUMIN/CREAT UR: NORMAL MG/G CR (ref 0–25)
POTASSIUM SERPL-SCNC: 4 MMOL/L (ref 3.4–5.3)
PROT SERPL-MCNC: 7.4 G/DL (ref 6.8–8.8)
SODIUM SERPL-SCNC: 139 MMOL/L (ref 133–144)
TSH SERPL DL<=0.005 MIU/L-ACNC: 0.68 MU/L (ref 0.4–4)

## 2018-11-07 ENCOUNTER — ALLIED HEALTH/NURSE VISIT (OUTPATIENT)
Dept: NURSING | Facility: CLINIC | Age: 48
End: 2018-11-07
Payer: COMMERCIAL

## 2018-11-07 DIAGNOSIS — Z30.42 SURVEILLANCE FOR INJECTABLE MEDROXYPROGESTERONE/ESTRADIOL: Primary | ICD-10-CM

## 2018-11-07 PROCEDURE — 96372 THER/PROPH/DIAG INJ SC/IM: CPT

## 2018-11-07 PROCEDURE — 99207 ZZC NO CHARGE NURSE ONLY: CPT

## 2018-11-07 NOTE — MR AVS SNAPSHOT
After Visit Summary   11/7/2018    Adela Ernandez    MRN: [id number]           Patient Information     Date Of Birth          1970        Visit Information        Provider Department      11/7/2018 3:10 PM AN ANCILLARY [facility]        Today's Diagnoses     Surveillance for injectable medroxyprogesterone/estradiol    -  1       Follow-ups after your visit        Your next 10 appointments already scheduled     Nov 07, 2018  3:10 PM CST   Nurse Only with AN ANCILLARY   [facility]    [address] [facility] [address] [phone]              Who to contact     If you have questions or need follow up information about today's clinic visit or your schedule please contact [facility] directly at [phone].  Normal or non-critical lab and imaging results will be communicated to you by MyChart, letter or phone within 4 business days after the clinic has received the results. If you do not hear from us within 7 days, please contact the clinic through MyChart or phone. If you have a critical or abnormal lab result, we will notify you by phone as soon as possible.  Submit refill requests through Val[employer]oo [employer] or call your pharmacy and they will forward the refill request to us. Please allow 3 business days for your refill to be completed.          Additional Information About Your Visit        MyChart Information     [employer] gives you secure access to your electronic health record. If you see a primary care provider, you can also send messages to your care team and make appointments. If you have questions, please call your primary care clinic.  If you do not have a primary care provider, please call [phone] and they will assist you.        Care EveryWhere ID     This is your Care EveryWhere ID. This could be used by other organizations to access your [place] medical records  [id number]         Blood Pressure from Last 3  Encounters:   10/31/18 135/73   08/15/18 138/80   05/29/18 139/75    Weight from Last 3 Encounters:   10/31/18 214 lb (97.1 kg)   08/15/18 213 lb (96.6 kg)   05/29/18 225 lb (102.1 kg)              We Performed the Following     C Medroxyprogesterone inj/1mg     INJECTION INTRAMUSCULAR OR SUB-Q        Primary Care Provider Office Phone # Fax #    Gena Hernandez [phone] [address] [facility] [address]        Equal Access to Services     [facility]: Hadii jasmyn silvestre hadmamadou Somony, waaxda jyothi, qaybta kaalmada naye, rolf valdez . So [facility] [phone].    ATENCIÓN: Si habla español, tiene a michael disposición servicios gratuitos de asistencia lingüística. [facility] [phone].    We comply with applicable federal civil rights laws and [place] laws. We do not discriminate on the basis of race, color, national origin, age, disability, sex, sexual orientation, or gender identity.            Thank you!     Thank you for choosing [facility]  for your care. Our goal is always to provide you with excellent care. Hearing back from our patients is one way we can continue to improve our services. Please take a few minutes to complete the written survey that you may receive in the mail after your visit with us. Thank you!             Your Updated Medication List - Protect others around you: Learn how to safely use, store and throw away your medicines at www.disposemymeds.org.          This list is accurate as of 11/7/18  3:06 PM.  Always use your most recent med list.                   Brand Name Dispense Instructions for use Diagnosis    losartan-hydrochlorothiazide 50-12.5 MG per tablet    HYZAAR    90 tablet    Take 1 tablet by mouth daily    Essential hypertension       medroxyPROGESTERone 150 MG/ML injection    DEPO-PROVERA    1 mL    Inject 1 mL (150 mg) into the muscle every 3 months    Encounter for surveillance of injectable  contraceptive       RANITIDINE HCL PO      Take 150 mg by mouth as needed for other (pain in mid section from gall bladder)

## 2018-11-07 NOTE — PROGRESS NOTES
BP: Data Unavailable    LAST PAP/EXAM: Lab Results       Component                Value               Date                       PAP                      NIL                 10/11/2017            URINE HCG:not indicated    The following medication was given:     MEDICATION: Depo Provera 150mg  ROUTE: IM  SITE: Arm - Right  : amphastar  LOT #: [id number]  EXP:5/2020  NEXT INJECTION DUE: 1/23/19 - 2/6/19   Provider: Gena Gregory MA

## 2018-11-13 ENCOUNTER — TELEPHONE (OUTPATIENT)
Dept: FAMILY MEDICINE | Facility: CLINIC | Age: 48
End: 2018-11-13

## 2018-11-13 NOTE — TELEPHONE ENCOUNTER
Panel Management Review      Patient has the following on her problem list:     Diabetes    ASA: Not Required     Last A1C  Lab Results   Component Value Date    A1C 6.5 10/31/2018    A1C 7.9 10/11/2017    A1C 5.5 08/16/2012    A1C 6.7 01/26/2011     A1C tested: Passed    Last LDL:    Lab Results   Component Value Date    CHOL 171 08/10/2012     Lab Results   Component Value Date    HDL 34 08/10/2012     Lab Results   Component Value Date    LDL 91 08/10/2012     Lab Results   Component Value Date    TRIG 227 08/10/2012     Lab Results   Component Value Date    CHOLHDLRATIO 5.0 08/10/2012     No results found for: NHDL    Is the patient on a Statin? NO             Is the patient on Aspirin? NO        Last three blood pressure readings:  BP Readings from Last 3 Encounters:   10/31/18 135/73   08/15/18 138/80   05/29/18 139/75       Date of last diabetes office visit: 10/31/18     Tobacco History:     History   Smoking Status     Never Smoker   Smokeless Tobacco     Never Used     Comment: lives in a smoke free household.         Hypertension   Last three blood pressure readings:  BP Readings from Last 3 Encounters:   10/31/18 135/73   08/15/18 138/80   05/29/18 139/75     Blood pressure: Passed    HTN Guidelines:  Age 18-59 BP range:  Less than 140/90  Age 60-85 with Diabetes:  Less than 140/90  Age 60-85 without Diabetes:  less than 150/90      Composite cancer screening  Chart review shows that this patient is due/due soon for the following None  Summary:    Patient is due/failing the following:   Patient is not due for anything. Patient was just seen on 10/31/8 and rechecked diabetes and physical. Not needing to come in for 3-6 months depending on labs.     Action needed:   None    Type of outreach:    None    Questions for provider review:    None                                                                                                                                    YESI Doty       Chart routed  to none .

## 2019-01-02 DIAGNOSIS — Z30.42 SURVEILLANCE FOR INJECTABLE MEDROXYPROGESTERONE/ESTRADIOL: Primary | ICD-10-CM

## 2019-01-02 RX ORDER — MEDROXYPROGESTERONE ACETATE 150 MG/ML
150 INJECTION, SUSPENSION INTRAMUSCULAR
Status: COMPLETED | OUTPATIENT
Start: 2019-01-02 | End: 2019-09-19

## 2019-01-17 ENCOUNTER — MYC MEDICAL ADVICE (OUTPATIENT)
Dept: FAMILY MEDICINE | Facility: CLINIC | Age: 49
End: 2019-01-17

## 2019-01-18 NOTE — TELEPHONE ENCOUNTER
I faxed the form to Mis Descuentos @[phone].  A copy mailed to the patients home address.  [employer] message sent.  Naz Arechiga,

## 2019-01-23 ENCOUNTER — ALLIED HEALTH/NURSE VISIT (OUTPATIENT)
Dept: NURSING | Facility: CLINIC | Age: 49
End: 2019-01-23
Payer: COMMERCIAL

## 2019-01-23 VITALS
BODY MASS INDEX: 39.87 KG/M2 | DIASTOLIC BLOOD PRESSURE: 76 MMHG | HEART RATE: 82 BPM | SYSTOLIC BLOOD PRESSURE: 142 MMHG | WEIGHT: 218 LBS

## 2019-01-23 DIAGNOSIS — Z30.42 SURVEILLANCE FOR INJECTABLE MEDROXYPROGESTERONE/ESTRADIOL: Primary | ICD-10-CM

## 2019-01-23 PROCEDURE — 99207 ZZC NO CHARGE NURSE ONLY: CPT

## 2019-01-23 PROCEDURE — 96372 THER/PROPH/DIAG INJ SC/IM: CPT

## 2019-01-23 RX ADMIN — MEDROXYPROGESTERONE ACETATE 150 MG: 150 INJECTION, SUSPENSION INTRAMUSCULAR at 16:21

## 2019-02-22 ENCOUNTER — TELEPHONE (OUTPATIENT)
Dept: FAMILY MEDICINE | Facility: CLINIC | Age: 49
End: 2019-02-22

## 2019-02-22 NOTE — TELEPHONE ENCOUNTER
Panel Management Review      Patient has the following on her problem list:     Diabetes    ASA: Failed    Last A1C  Lab Results   Component Value Date    A1C 6.5 10/31/2018    A1C 7.9 10/11/2017    A1C 5.5 08/16/2012    A1C 6.7 01/26/2011     A1C tested: Passed    Last LDL:    Lab Results   Component Value Date    CHOL 171 08/10/2012     Lab Results   Component Value Date    HDL 34 08/10/2012     Lab Results   Component Value Date    LDL 91 08/10/2012     Lab Results   Component Value Date    TRIG 227 08/10/2012     Lab Results   Component Value Date    CHOLHDLRATIO 5.0 08/10/2012     No results found for: NHDL    Is the patient on a Statin? NO             Is the patient on Aspirin? NO        Last three blood pressure readings:  BP Readings from Last 3 Encounters:   01/23/19 142/76   10/31/18 135/73   08/15/18 138/80       Date of last diabetes office visit: 10/31/18     Tobacco History:     History   Smoking Status     Never Smoker   Smokeless Tobacco     Never Used     Comment: lives in a smoke free household.         Hypertension   Last three blood pressure readings:  BP Readings from Last 3 Encounters:   01/23/19 142/76   10/31/18 135/73   08/15/18 138/80     Blood pressure: FAILED    HTN Guidelines:  Age 18-59 BP range:  Less than 140/90  Age 60-85 with Diabetes:  Less than 140/90  Age 60-85 without Diabetes:  less than 150/90      Composite cancer screening  Chart review shows that this patient is due/due soon for the following None  Summary:    Patient is due/failing the following:   BP CHECK    Action needed:   Ancillary visit    Type of outreach:    Sent [employer] message.    Questions for provider review:    None                                                                                                                                    Gabriela Huffman MA       Chart routed to Care Team .

## 2019-04-12 ENCOUNTER — ALLIED HEALTH/NURSE VISIT (OUTPATIENT)
Dept: NURSING | Facility: CLINIC | Age: 49
End: 2019-04-12
Payer: COMMERCIAL

## 2019-04-12 VITALS
BODY MASS INDEX: 40.79 KG/M2 | HEART RATE: 78 BPM | DIASTOLIC BLOOD PRESSURE: 79 MMHG | WEIGHT: 223 LBS | SYSTOLIC BLOOD PRESSURE: 140 MMHG

## 2019-04-12 DIAGNOSIS — Z30.42 SURVEILLANCE FOR INJECTABLE MEDROXYPROGESTERONE/ESTRADIOL: Primary | ICD-10-CM

## 2019-04-12 PROCEDURE — 99207 ZZC NO CHARGE NURSE ONLY: CPT

## 2019-04-12 PROCEDURE — 96372 THER/PROPH/DIAG INJ SC/IM: CPT

## 2019-04-12 RX ADMIN — MEDROXYPROGESTERONE ACETATE 150 MG: 150 INJECTION, SUSPENSION INTRAMUSCULAR at 09:14

## 2019-04-12 NOTE — PROGRESS NOTES
Prior to injection, verified patient identity using patient's name and date of birth.  Due to injection administration, patient instructed to remain in clinic for 15 minutes  afterwards, and to report any adverse reaction to me immediately.    BP: 140/79    LAST PAP/EXAM:   Lab Results   Component Value Date    PAP NIL 10/11/2017     URINE HCG:not indicated    NEXT INJECTION DUE: 6/28/19 - 7/12/19       Drug Amount Wasted:  None.  Vial/Syringe: Single dose vial  Expiration Date:  7/2020    Elzbieta Gregory MA

## 2019-05-06 ENCOUNTER — TELEPHONE (OUTPATIENT)
Dept: FAMILY MEDICINE | Facility: CLINIC | Age: 49
End: 2019-05-06

## 2019-05-09 ENCOUNTER — TELEPHONE (OUTPATIENT)
Dept: FAMILY MEDICINE | Facility: CLINIC | Age: 49
End: 2019-05-09

## 2019-05-09 NOTE — TELEPHONE ENCOUNTER
Panel Management Review      Patient has the following on her problem list:     Diabetes    ASA: Failed    Last A1C  Lab Results   Component Value Date    A1C 6.5 10/31/2018    A1C 7.9 10/11/2017    A1C 5.5 08/16/2012    A1C 6.7 01/26/2011     A1C tested: Passed    Last LDL:    Lab Results   Component Value Date    CHOL 171 08/10/2012     Lab Results   Component Value Date    HDL 34 08/10/2012     Lab Results   Component Value Date    LDL 91 08/10/2012     Lab Results   Component Value Date    TRIG 227 08/10/2012     Lab Results   Component Value Date    CHOLHDLRATIO 5.0 08/10/2012     No results found for: NHDL    Is the patient on a Statin? NO             Is the patient on Aspirin? NO        Last three blood pressure readings:  BP Readings from Last 3 Encounters:   04/12/19 140/79   01/23/19 142/76   10/31/18 135/73       Date of last diabetes office visit: 10/31/18     Tobacco History:     History   Smoking Status     Never Smoker   Smokeless Tobacco     Never Used     Comment: lives in a smoke free household.         Hypertension   Last three blood pressure readings:  BP Readings from Last 3 Encounters:   04/12/19 140/79   01/23/19 142/76   10/31/18 135/73     Blood pressure: Passed    HTN Guidelines:  Less than 140/90      Composite cancer screening  Chart review shows that this patient is due/due soon for the following None  Summary:    Patient is due/failing the following:   A1C, ASA, BP CHECK and LDL    Action needed:   Patient needs office visit for dm check.    Type of outreach:    Sent [employer] message.    Questions for provider review:    None                                                                                                                                    Gabriela Huffman MA       Chart routed to Care Team .

## 2019-05-13 ENCOUNTER — OFFICE VISIT (OUTPATIENT)
Dept: OPTOMETRY | Facility: CLINIC | Age: 49
End: 2019-05-13
Payer: COMMERCIAL

## 2019-05-13 DIAGNOSIS — H52.4 PRESBYOPIA: ICD-10-CM

## 2019-05-13 DIAGNOSIS — H52.03 HYPEROPIA, BILATERAL: Primary | ICD-10-CM

## 2019-05-13 DIAGNOSIS — E11.9 TYPE 2 DIABETES MELLITUS WITHOUT COMPLICATION, WITHOUT LONG-TERM CURRENT USE OF INSULIN (H): ICD-10-CM

## 2019-05-13 DIAGNOSIS — H52.223 REGULAR ASTIGMATISM OF BOTH EYES: ICD-10-CM

## 2019-05-13 PROCEDURE — 92004 COMPRE OPH EXAM NEW PT 1/>: CPT | Performed by: OPTOMETRIST

## 2019-05-13 PROCEDURE — 92015 DETERMINE REFRACTIVE STATE: CPT | Performed by: OPTOMETRIST

## 2019-05-13 ASSESSMENT — TONOMETRY
OD_IOP_MMHG: 18
IOP_METHOD: APPLANATION
OS_IOP_MMHG: 18

## 2019-05-13 ASSESSMENT — CONF VISUAL FIELD
OS_NORMAL: 1
OD_NORMAL: 1
METHOD: COUNTING FINGERS

## 2019-05-13 ASSESSMENT — VISUAL ACUITY
OD_CC+: -1
OD_CC: 20/30
OS_CC: 20/30
CORRECTION_TYPE: GLASSES
METHOD: SNELLEN - LINEAR
OS_CC: 20/25
OD_CC: 20/40-1

## 2019-05-13 ASSESSMENT — KERATOMETRY
OD_K2POWER_DIOPTERS: 42.50
OS_AXISANGLE2_DEGREES: 14
OD_AXISANGLE2_DEGREES: 151
OD_K1POWER_DIOPTERS: 43.00
OS_K1POWER_DIOPTERS: 42.75
OS_K2POWER_DIOPTERS: 42.00

## 2019-05-13 ASSESSMENT — SLIT LAMP EXAM - LIDS
COMMENTS: NORMAL
COMMENTS: NORMAL

## 2019-05-13 ASSESSMENT — REFRACTION_MANIFEST
OD_CYLINDER: +0.75
OD_SPHERE: +1.75
METHOD_AUTOREFRACTION: 1
OS_SPHERE: +1.50
OD_AXIS: 170
OS_SPHERE: +1.75
OS_CYLINDER: +1.00
OS_AXIS: 015
OD_AXIS: 001
OS_ADD: +2.00
OS_AXIS: 004
OD_ADD: +2.00
OD_CYLINDER: +0.75
OS_CYLINDER: +1.25
OD_SPHERE: +2.00

## 2019-05-13 ASSESSMENT — CUP TO DISC RATIO
OD_RATIO: 0.4
OS_RATIO: 0.4

## 2019-05-13 ASSESSMENT — REFRACTION_WEARINGRX
OD_ADD: +1.75
OS_CYLINDER: +0.75
OD_CYLINDER: +0.50
OD_SPHERE: +1.50
OS_AXIS: 015
OS_SPHERE: +1.00
OD_AXIS: 145
OS_ADD: +1.75
SPECS_TYPE: PAL

## 2019-05-13 ASSESSMENT — EXTERNAL EXAM - RIGHT EYE: OD_EXAM: NORMAL

## 2019-05-13 ASSESSMENT — EXTERNAL EXAM - LEFT EYE: OS_EXAM: NORMAL

## 2019-05-13 NOTE — PROGRESS NOTES
Chief Complaint   Patient presents with     Annual Eye Exam     Yearly        Lab Results   Component Value Date    A1C 6.5 10/31/2018    A1C 7.9 10/11/2017    A1C 5.5 08/16/2012    A1C 6.7 01/26/2011     Patient said that she is considered pre diabetic   She would like to talk to the doctor about the possibility of wearing contacts, she has been told before that it wasn't a good idea due to astigmatism       Last Eye Exam: 2/2018  Dilated Previously: Yes    What are you currently using to see?  Glasses, wears them all of the time        Distance Vision Acuity: Satisfied with vision, not aware of any changes     Near Vision Acuity: Satisfied with vision while reading and using computer with glasses, no changes     Eye Comfort: good, tired sometimes   Do you use eye drops? : No  Occupation or Hobbies: Accounting -computer use    Juanita Apple Optometric Assistant           Medical, surgical and family histories reviewed and updated 5/13/2019.       OBJECTIVE: See Ophthalmology exam    ASSESSMENT:    ICD-10-CM    1. Hyperopia, bilateral H52.03    2. Regular astigmatism of both eyes H52.223    3. Presbyopia H52.4    4. Type 2 diabetes mellitus without complication, without long-term current use of insulin (H) E11.9       PLAN:     Patient Instructions   Patient was advised of today's exam findings.  Fill glasses prescription  Contact lenses not advised due to astigmatism and need of reading prescription   Return in 1 year for eye exam    Christiana Chung O.D.  [facility]   [address] [place], [place] [id number] [phone]

## 2019-05-13 NOTE — LETTER
5/13/2019         RE: Adela Ernandez  [address] [facility] [address]        Dear Colleague,    Thank you for referring your patient, Adela Ernandez, to the [facility]. No diabetic retinopathy found at eye exam. See attached chart note for more details.          Again, thank you for allowing me to participate in the care of your patient.        Sincerely,        Christiana Chung OD

## 2019-05-13 NOTE — PATIENT INSTRUCTIONS
Patient was advised of today's exam findings.  Fill glasses prescription  Contact lenses not advised due to astigmatism and need of reading prescription   Return in 1 year for eye exam    Christiana Chung O.D.  [facility]   [address] [place] [address] [phone]

## 2019-05-25 DIAGNOSIS — I10 ESSENTIAL HYPERTENSION: ICD-10-CM

## 2019-05-29 DIAGNOSIS — I10 ESSENTIAL HYPERTENSION: ICD-10-CM

## 2019-05-29 RX ORDER — LOSARTAN POTASSIUM AND HYDROCHLOROTHIAZIDE 12.5; 5 MG/1; MG/1
1 TABLET ORAL DAILY
Qty: 90 TABLET | Refills: 0 | Status: SHIPPED | OUTPATIENT
Start: 2019-05-29 | End: 2019-11-12

## 2019-05-31 RX ORDER — LOSARTAN POTASSIUM AND HYDROCHLOROTHIAZIDE 12.5; 5 MG/1; MG/1
1 TABLET ORAL DAILY
Qty: 90 TABLET | Refills: 0 | OUTPATIENT
Start: 2019-05-31

## 2019-06-04 ENCOUNTER — TELEPHONE (OUTPATIENT)
Dept: FAMILY MEDICINE | Facility: CLINIC | Age: 49
End: 2019-06-04

## 2019-06-04 DIAGNOSIS — I10 ESSENTIAL HYPERTENSION: ICD-10-CM

## 2019-06-04 RX ORDER — LOSARTAN POTASSIUM AND HYDROCHLOROTHIAZIDE 12.5; 5 MG/1; MG/1
1 TABLET ORAL DAILY
Qty: 90 TABLET | Refills: 0 | OUTPATIENT
Start: 2019-06-04

## 2019-06-04 NOTE — TELEPHONE ENCOUNTER
I spoke with pharmacist Vanessa and she states they did not get the refill sent for Hyzaar on 5/29/19.  On 5/31 a second refill refill was denied as it was a duplicate of the one sent on 5/29/19. Vanessa was given a verbal ok to refill for #90 . There is also a request for another refill of same medication today which I will deny as this has been taken care of.  Franca Lindsey RN

## 2019-06-04 NOTE — TELEPHONE ENCOUNTER
Pharmacy is calling stating received a RX Denial for RX: -hydrochlorothiazide (HYZAAR) 50-12.5 MG tablet and would like to discuss details. Caller informed that calls received after 3pm may not be returned same day. Please call to discuss. Thank you.

## 2019-07-01 ENCOUNTER — ALLIED HEALTH/NURSE VISIT (OUTPATIENT)
Dept: NURSING | Facility: CLINIC | Age: 49
End: 2019-07-01
Payer: COMMERCIAL

## 2019-07-01 VITALS
DIASTOLIC BLOOD PRESSURE: 84 MMHG | WEIGHT: 228 LBS | SYSTOLIC BLOOD PRESSURE: 128 MMHG | BODY MASS INDEX: 41.7 KG/M2 | HEART RATE: 99 BPM

## 2019-07-01 DIAGNOSIS — Z30.42 DEPO-PROVERA CONTRACEPTIVE STATUS: Primary | ICD-10-CM

## 2019-07-01 PROCEDURE — 96372 THER/PROPH/DIAG INJ SC/IM: CPT

## 2019-07-01 PROCEDURE — 99207 ZZC NO CHARGE NURSE ONLY: CPT

## 2019-07-01 RX ADMIN — MEDROXYPROGESTERONE ACETATE 150 MG: 150 INJECTION, SUSPENSION INTRAMUSCULAR at 16:37

## 2019-07-01 NOTE — NURSING NOTE
Prior to injection, verified patient identity using patient's name and date of birth.  Due to injection administration, patient instructed to remain in clinic for 15 minutes  afterwards, and to report any adverse reaction to me immediately.    BP: 128/84    LAST PAP/EXAM: Lab Results       Component                Value               Date                       PAP                      NIL                 10/11/2017            URINE HCG:not indicated    The following medication was given:     MEDICATION: Depo Provera 150mg  ROUTE: IM  SITE: Deltoid - Left  : Hilda  LOT #: [id number]  EXP:01/01/2021  NDC: 80800-007-14  NEXT INJECTION DUE: 9/16/19 - 9/30/19   Provider: Dr. Marcos    Drug Amount Wasted:  None.  Vial/Syringe: Single dose vial      Johnny Turner MA

## 2019-07-26 NOTE — PROGRESS NOTES
Adela Ernandez  :  1970  DOS: 2019  MRN: [id number]    Sports Medicine Clinic Visit    PCP: Gena Hernandez    Adela Ernandez is a 49 year old female who is seen as a self referral presenting with left knee pain.    Injury: End of May 2019 after working out--progressive onset.  Pain located over left anterior/lateral, nonradiating.  Additional Features:  Positive: catching, intermittent sharp and throbbing pain.  Symptoms are better with Ice, Ibuprofen, Rest, Knee sleeve, TENS unit.  Symptoms are worse with: treadmill, eliptical. Stationary bike, prolonged walking, swatting.  Other evaluation and/or treatments so far consists of:  Ice, Ibuprofen, Rest, Knee sleeve.  Recent imaging completed: No recent imaging completed.  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    Past Medical History:   Diagnosis Date     Carpal tunnel syndrome      Eczema      Essential hypertension 10/21/2016     Migraines      Obesity      Past Surgical History:   Procedure Laterality Date     C APPENDECTOMY       C STOMACH SURGERY PROCEDURE UNLISTED       Family History   Problem Relation Age of Onset     Hypertension Mother      Lipids Mother      Macular Degeneration Mother      Cancer - colorectal Father         age 73     Hypertension Father      Cancer Father         prostate     Heart Disease Father         heart attack, pace maker     Cancer Sister         cervical     Hypertension Sister      Lipids Sister      Cerebrovascular Disease Maternal Grandfather      Cerebrovascular Disease Paternal Grandfather      Hypertension Sister      Diabetes Sister      Cancer - colorectal Paternal Uncle      Glaucoma No family hx of      Thyroid Disease No family hx of        Objective  BP  "122/76 (BP Location: Right arm, Patient Position: Sitting, Cuff Size: Adult Large)   Ht 1.575 m (5' 2\")   Wt 103.4 kg (228 lb)   BMI 41.70 kg/m        General: healthy, alert and in no distress      HEENT: no scleral icterus or conjunctival erythema     Skin: no suspicious lesions or rash. No jaundice.     CV: regular rhythm by palpation, 2+ distal pulses, no pedal edema      Resp: normal respiratory effort without conversational dyspnea     Psych: normal mood and affect      Gait: nonantalgic, appropriate coordination and balance     Neuro: normal light touch sensory exam of the extremities. Motor strength as noted below     Left Knee exam    ROM:        Full active and passive ROM with flexion and extension    Inspection:       no visible ecchymosis       no visible edema or effusion    Skin:       no visible deformities       well perfused       capillary refill brisk    Patellar Motion:        Normal patellar tracking noted through range of motion       Lateral tilt noted in patella       Crepitus noted in the patellofemoral joint    Tender:        medial patellar border       lateral patellar border       medial joint line    Non Tender:         remainder of knee area    Special Tests:        neg (-) Miguelina       neg (-) Lachman       neg (-) anterior drawer       neg (-) posterior drawer       neg (-) varus at 0 deg and 30 deg       neg (-) valgus at 0 deg and 30 deg       Positive PF grind    Evaluation of ipsilateral kinetic chain       normal strength with hip extension and abduction       normal strength with single leg squat       normal medial longitudinal arch in both feet      Radiology  Recent Results (from the past 744 hour(s))   XR Knee Standing AP Bilat [place] Bilat Lat Left    Narrative    XR KNEE STANDING AP BILAT [place] BILAT LAT LT 7/27/2019 8:18 AM    HISTORY: Chronic left knee pain.    COMPARISON: None.    FINDINGS: Joint spaces are preserved. No significant osseous  degenerative " change. No joint effusion. No malalignment.      Impression    IMPRESSION: Osseous structures appear normal.    ADRIEL BANUELOS MD       Assessment:  1. Acute pain of left knee    2. Patellofemoral arthralgia of left knee    3. Chronic left shoulder pain    4. Rotator cuff tendinitis, left        Plan:  Discussed the assessment with the patient.  Follow up: prn based on clinical progress  Start PT, signs of PFS, can also develop HEP for ongoing chronic shoulder pain  Apply HEP to both legs  Need to work on hip and knee stabilization reviewed, training principles reviewed  Use of short 1-2 wk course of NSAIDs reviewed, dosing and precautions reviewed if utilized  Otherwise try to use tylenol prn, and not before competition or training to mask symptoms  RICE reviewed, as well as potential brace usage and principles of appropriate brace usage for comfort without becoming dependent  Painfree activity ok, limit squatting, lunging, jumping, stairs  We discussed modified progressive pain-free activity as tolerated  Home handouts provided and supportive care reviewed  All questions were answered today  Contact us with additional questions or concerns  Signs and sx of concern reviewed      Carlos Blake DO, HERMAN  Primary Care Sports Medicine  [facility]           Disclaimer: This note consists of symbols derived from keyboarding, dictation and/or voice recognition software. As a result, there may be errors in the script that have gone undetected. Please consider this when interpreting information found in this chart.

## 2019-07-27 ENCOUNTER — ANCILLARY PROCEDURE (OUTPATIENT)
Dept: GENERAL RADIOLOGY | Facility: CLINIC | Age: 49
End: 2019-07-27
Attending: FAMILY MEDICINE
Payer: COMMERCIAL

## 2019-07-27 ENCOUNTER — OFFICE VISIT (OUTPATIENT)
Dept: ORTHOPEDICS | Facility: CLINIC | Age: 49
End: 2019-07-27
Payer: COMMERCIAL

## 2019-07-27 VITALS
BODY MASS INDEX: 41.96 KG/M2 | WEIGHT: 228 LBS | SYSTOLIC BLOOD PRESSURE: 122 MMHG | DIASTOLIC BLOOD PRESSURE: 76 MMHG | HEIGHT: 62 IN

## 2019-07-27 DIAGNOSIS — G89.29 CHRONIC PAIN OF LEFT KNEE: ICD-10-CM

## 2019-07-27 DIAGNOSIS — M25.562 ACUTE PAIN OF LEFT KNEE: Primary | ICD-10-CM

## 2019-07-27 DIAGNOSIS — M75.82 ROTATOR CUFF TENDINITIS, LEFT: ICD-10-CM

## 2019-07-27 DIAGNOSIS — M25.562 CHRONIC PAIN OF LEFT KNEE: ICD-10-CM

## 2019-07-27 DIAGNOSIS — G89.29 CHRONIC LEFT SHOULDER PAIN: ICD-10-CM

## 2019-07-27 DIAGNOSIS — M25.562 PATELLOFEMORAL ARTHRALGIA OF LEFT KNEE: ICD-10-CM

## 2019-07-27 DIAGNOSIS — M25.512 CHRONIC LEFT SHOULDER PAIN: ICD-10-CM

## 2019-07-27 PROCEDURE — 73562 X-RAY EXAM OF KNEE 3: CPT

## 2019-07-27 PROCEDURE — 99203 OFFICE O/P NEW LOW 30 MIN: CPT | Performed by: FAMILY MEDICINE

## 2019-07-27 ASSESSMENT — MIFFLIN-ST. JEOR: SCORE: 1617.45

## 2019-07-27 NOTE — LETTER
2019         RE: Adela Ernandez  [address] [facility] [address]        Dear Colleague,    Thank you for referring your patient, Adela Ernandez, to the [facility]. Please see a copy of my visit note below.    Adela Ernandez  :  1970  DOS: 2019  MRN: [id number]    Sports Medicine Clinic Visit    PCP: Gena Hernandez    Adela Ernandez is a 49 year old female who is seen as a self referral presenting with left knee pain.    Injury: End of May 2019 after working out--progressive onset.  Pain located over left anterior/lateral, nonradiating.  Additional Features:  Positive: catching, intermittent sharp and throbbing pain.  Symptoms are better with Ice, Ibuprofen, Rest, Knee sleeve, TENS unit.  Symptoms are worse with: treadmill, eliptical. Stationary bike, prolonged walking, swatting.  Other evaluation and/or treatments so far consists of:  Ice, Ibuprofen, Rest, Knee sleeve.  Recent imaging completed: No recent imaging completed.  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    Past Medical History:   Diagnosis Date     Carpal tunnel syndrome      Eczema      Essential hypertension 10/21/2016     Migraines      Obesity      Past Surgical History:   Procedure Laterality Date     C APPENDECTOMY       C STOMACH SURGERY PROCEDURE UNLISTED       Family History   Problem Relation Age of Onset     Hypertension Mother      Lipids Mother      Macular Degeneration Mother      Cancer - colorectal Father         age 73     Hypertension Father      Cancer Father         prostate     Heart Disease Father         heart attack, pace maker     Cancer Sister         cervical     Hypertension Sister      Lipids Sister       "Cerebrovascular Disease Maternal Grandfather      Cerebrovascular Disease Paternal Grandfather      Hypertension Sister      Diabetes Sister      Cancer - colorectal Paternal Uncle      Glaucoma No family hx of      Thyroid Disease No family hx of        Objective  /76 (BP Location: Right arm, Patient Position: Sitting, Cuff Size: Adult Large)   Ht 1.575 m (5' 2\")   Wt 103.4 kg (228 lb)   BMI 41.70 kg/m         General: healthy, alert and in no distress      HEENT: no scleral icterus or conjunctival erythema     Skin: no suspicious lesions or rash. No jaundice.     CV: regular rhythm by palpation, 2+ distal pulses, no pedal edema      Resp: normal respiratory effort without conversational dyspnea     Psych: normal mood and affect      Gait: nonantalgic, appropriate coordination and balance     Neuro: normal light touch sensory exam of the extremities. Motor strength as noted below     Left Knee exam    ROM:        Full active and passive ROM with flexion and extension    Inspection:       no visible ecchymosis       no visible edema or effusion    Skin:       no visible deformities       well perfused       capillary refill brisk    Patellar Motion:        Normal patellar tracking noted through range of motion       Lateral tilt noted in patella       Crepitus noted in the patellofemoral joint    Tender:        medial patellar border       lateral patellar border       medial joint line    Non Tender:         remainder of knee area    Special Tests:        neg (-) Miguelina       neg (-) Lachman       neg (-) anterior drawer       neg (-) posterior drawer       neg (-) varus at 0 deg and 30 deg       neg (-) valgus at 0 deg and 30 deg       Positive PF grind    Evaluation of ipsilateral kinetic chain       normal strength with hip extension and abduction       normal strength with single leg squat       normal medial longitudinal arch in both feet      Radiology  Recent Results (from the past 744 hour(s)) "   XR Knee Standing AP Bilat [place] Bilat Lat Left    Narrative    XR KNEE STANDING AP BILAT [place] BILAT LAT LT 7/27/2019 8:18 AM    HISTORY: Chronic left knee pain.    COMPARISON: None.    FINDINGS: Joint spaces are preserved. No significant osseous  degenerative change. No joint effusion. No malalignment.      Impression    IMPRESSION: Osseous structures appear normal.    ADRIEL BANUELOS MD       Assessment:  1. Acute pain of left knee    2. Patellofemoral arthralgia of left knee    3. Chronic left shoulder pain    4. Rotator cuff tendinitis, left        Plan:  Discussed the assessment with the patient.  Follow up: prn based on clinical progress  Start PT, signs of PFS, can also develop HEP for ongoing chronic shoulder pain  Apply HEP to both legs  Need to work on hip and knee stabilization reviewed, training principles reviewed  Use of short 1-2 wk course of NSAIDs reviewed, dosing and precautions reviewed if utilized  Otherwise try to use tylenol prn, and not before competition or training to mask symptoms  RICE reviewed, as well as potential brace usage and principles of appropriate brace usage for comfort without becoming dependent  Painfree activity ok, limit squatting, lunging, jumping, stairs  We discussed modified progressive pain-free activity as tolerated  Home handouts provided and supportive care reviewed  All questions were answered today  Contact us with additional questions or concerns  Signs and sx of concern reviewed      Carlos Blake DO, HERMAN  Primary Care Sports Medicine  [facility]           Disclaimer: This note consists of symbols derived from keyboarding, dictation and/or voice recognition software. As a result, there may be errors in the script that have gone undetected. Please consider this when interpreting information found in this chart.    Again, thank you for allowing me to participate in the care of your patient.        Sincerely,        Carlos Blake,  DO

## 2019-08-12 ENCOUNTER — THERAPY VISIT (OUTPATIENT)
Dept: PHYSICAL THERAPY | Facility: CLINIC | Age: 49
End: 2019-08-12
Attending: FAMILY MEDICINE
Payer: COMMERCIAL

## 2019-08-12 DIAGNOSIS — G89.29 CHRONIC LEFT SHOULDER PAIN: ICD-10-CM

## 2019-08-12 DIAGNOSIS — M75.82 ROTATOR CUFF TENDINITIS, LEFT: ICD-10-CM

## 2019-08-12 DIAGNOSIS — M25.562 CHRONIC PAIN OF LEFT KNEE: Primary | ICD-10-CM

## 2019-08-12 DIAGNOSIS — M25.512 CHRONIC LEFT SHOULDER PAIN: ICD-10-CM

## 2019-08-12 DIAGNOSIS — M25.562 PATELLOFEMORAL ARTHRALGIA OF LEFT KNEE: ICD-10-CM

## 2019-08-12 DIAGNOSIS — G89.29 CHRONIC PAIN OF LEFT KNEE: Primary | ICD-10-CM

## 2019-08-12 DIAGNOSIS — M25.562 ACUTE PAIN OF LEFT KNEE: ICD-10-CM

## 2019-08-12 PROCEDURE — 97162 PT EVAL MOD COMPLEX 30 MIN: CPT | Mod: GP | Performed by: PHYSICAL THERAPIST

## 2019-08-12 PROCEDURE — 97110 THERAPEUTIC EXERCISES: CPT | Mod: GP | Performed by: PHYSICAL THERAPIST

## 2019-08-12 PROCEDURE — 97140 MANUAL THERAPY 1/> REGIONS: CPT | Mod: GP | Performed by: PHYSICAL THERAPIST

## 2019-08-12 NOTE — PROGRESS NOTES
[facility] Initial Evaluation  Subjective:  The history is provided by the patient. No  was used.   Adela Ernandez being seen for left knee and left shoulder pain (patient is left hand dominant).   Problem began 7/27/2019 (date of MD referral). Problem occurred: 5/24/19, patient was doing the lateral eliptical machine at the gym.  Noticed gradual onset of knee pain afterward.  Left shoulder problem began 4 years ago due to a fall.  Shoulder pain eventually resolved, but then returned 1.5 years ago for no apparent reason  General health as reported by patient is good. Pertinent medical history includes:  Overweight, migraines/headaches, high blood pressure and other (bilateral CTS ).  Medical allergies: None.  Surgeries: Appendectomy.  Current medications:  High blood pressure medication.   Primary job tasks include:  Computer work, repetitive tasks, prolonged sitting and other (computer work).  Pain quality: knee is sharp/stabbing; shoulder is aching; both intermittent and rated as 2-3/10.  Pain is the same all the time. Since onset symptoms are gradually improving. Special tests:  X-ray (Left knee negative per patient).     Patient is . Restrictions include:  Working in normal job without restrictions.    Barriers include:  Bathroom/bedroom on second floor (split level home).  Red flags:  None as reported by patient.      Knee Pain Location: lateral aspect   Associated Symptoms:  None  Symptoms Exacerbated by: treadmill walking; lateral eliptical machine; kneeling,squatting  Symptoms Relieved by:  Ice, elevation    Shoulder Pain Location: superior aspect  Associated Symptoms: decreased ROM, decreased strength  Symptoms Exacerbated by: reaching overhead; reaching behind back; reaching to shoulder level; lifting  Symptoms Relieved by: avoidance of aggravating activites           Patient's Goals/Expectations:  To get knee and shoulder better; to learn what she  should/shouldn't be doing at the gym                  Objective:  Standing Alignment:    Cervical/Thoracic:  Forward head, cervical lordosis increased and thoracic kyphosis increased (poor sitting posture)  Shoulder/UE:  Rounded shoulders, protracted scapula L and protracted scapula R (excessive IR: stands with palms facing backward)        Knee:  Normal  Ankle/foot deviations: slightly supinated stance L>R.  General Deviations:  Toe out R      Flexibility/Screens:     Upper Extremity:    Decreased left upper extremity flexibility at:  Pectoralis Minor    Decreased right upper extremity flexibility present at:  Pectoralis Minor  Lower Extremity:  Decreased left lower extremity flexibility:Hip Flexors; Quadriceps; Hamstrings; Deep Retinacular Fibers; Gastroc and Soleus                             Shoulder Evaluation:  ROM:  AROM:    Flexion:  Left:  115 (+)    Right:  130    Abduction:  Left: 123 (+)   Right:  138      External Rotation:  Left:  53 (+)    Right:  80            Extension/Internal Rotation:  Left:  L3 (+)    Right:  L1    PROM:    Flexion:  Left:  145 (+)          Abduction:  Left:  126 (+)        Internal Rotation:  Left:  WNL      External Rotation:  Left:  70 (+)                        Strength:    Flexion: Left:5/5    Pain: +        Abduction:  Left: 5-/5   Pain:+        Internal Rotation:  Left:5/5      Pain:-      External Rotation:   Left:5/5      Pain:-               Special Tests:    Left shoulder positive for the following special tests:  Impingement (Neer )    Palpation:  not assessed                                   Knee Evaluation:  ROM:  AROM: normal  PROM: normal  Strength:  Normal            Ligament Testing:  Not Assessed                Special Tests:   Left knee positive for the following special tests:  Patellar Compression (Patellar grind)  Left knee negative for the following special tests:  Patellar Tracking-Abduction Lateral    Palpation:    Left knee tenderness present at:   Patellar Lateral  Left knee tenderness not present at:  Medial Joint Line; Lateral Joint Line; Patellar Tendon; Patellar Medial; Patellar Superior and Patellar Inferior    Edema:  Normal    Mobility Testing:      Patellofemoral Medial:  Left: hypomobile      Patellofemoral Lateral:  Left: hypomobile          Functional Testing:          Quad:    Single Leg Squat:  Left:       Moderate loss of control and decreased hip/trunk flexion  Right:       Moderate loss of control and decreased hip/trunk flexion  Bilateral Leg Squat:   Decreased hip/trunk flexion          Hip Strength:  Abductors 4+/5 bilat  Other:  Empty Can test 4+/5 and painful  General     ROS    Assessment/Plan:    Patient is a 49 year old female with left shoulder and left knee complaints.    Patient has the following significant findings with corresponding treatment plan.                Diagnosis 1:  Left Knee Pain    Pain -  self management, education and home program  Decreased ROM/flexibility - therapeutic exercise  Decreased joint mobility - manual therapy and therapeutic exercise  Decreased strength - therapeutic exercise and therapeutic activities  Decreased function - therapeutic activities and home program    Diagnosis 2:  Left Shoulder Pain   Pain -  self management, education and home program  Decreased ROM/flexibility - therapeutic exercise  Decreased strength - therapeutic exercise  Decreased function - home program  Impaired posture - neuro re-education    Therapy Evaluation Codes:   1) History comprised of:   Personal factors that impact the plan of care:      Time since onset of symptoms.    Comorbidity factors that impact the plan of care are:      High blood pressure and Overweight.     Medications impacting care: High blood pressure.  2) Examination of Body Systems comprised of:   Body structures and functions that impact the plan of care:      Knee and Shoulder.   Activity limitations that impact the plan of care are:      Bathing,  Dressing, Lifting, Squatting/kneeling and reaching.  3) Clinical presentation characteristics are:   Evolving/Changing.  4) Decision-Making    Moderate complexity using standardized patient assessment instrument and/or measureable assessment of functional outcome.  Cumulative Therapy Evaluation is: Moderate complexity.    Previous and current functional limitations:  (See Goal Flow Sheet for this information)    Short term and Long term goals: (See Goal Flow Sheet for this information)     Communication ability:  Patient appears to be able to clearly communicate and understand verbal and written communication and follow directions correctly.  Treatment Explanation - The following has been discussed with the patient:    RX ordered/plan of care  Anticipated outcomes  Possible risks and side effects  This patient would benefit from PT intervention to resume normal activities.   Rehab potential is good.    Frequency:  1 X week, once daily  Duration:  for 2 weeks, tapering to 2x/month for 1 month (prefers limited visits; HEP focus)  Discharge Plan:  Achieve all LTG.  Independent in home treatment program.  Reach maximal therapeutic benefit.    Please refer to the daily flowsheet for treatment today, total treatment time and time spent performing 1:1 timed codes.

## 2019-08-13 ASSESSMENT — ACTIVITIES OF DAILY LIVING (ADL)
KNEE_ACTIVITY_OF_DAILY_LIVING_SCORE: 67.14
GO DOWN STAIRS: ACTIVITY IS MINIMALLY DIFFICULT
STIFFNESS: I HAVE THE SYMPTOM BUT IT DOES NOT AFFECT MY ACTIVITY
STAND: ACTIVITY IS MINIMALLY DIFFICULT
SQUAT: ACTIVITY IS VERY DIFFICULT
SWELLING: I DO NOT HAVE THE SYMPTOM
AS_A_RESULT_OF_YOUR_KNEE_INJURY,_HOW_WOULD_YOU_RATE_YOUR_CURRENT_LEVEL_OF_DAILY_ACTIVITY?: NEARLY NORMAL
LIMPING: THE SYMPTOM AFFECTS MY ACTIVITY SLIGHTLY
GO UP STAIRS: ACTIVITY IS MINIMALLY DIFFICULT
WEAKNESS: I DO NOT HAVE THE SYMPTOM
SIT WITH YOUR KNEE BENT: ACTIVITY IS VERY DIFFICULT
PAIN: THE SYMPTOM AFFECTS MY ACTIVITY MODERATELY
KNEEL ON THE FRONT OF YOUR KNEE: ACTIVITY IS VERY DIFFICULT
HOW_WOULD_YOU_RATE_THE_OVERALL_FUNCTION_OF_YOUR_KNEE_DURING_YOUR_USUAL_DAILY_ACTIVITIES?: NEARLY NORMAL
GIVING WAY, BUCKLING OR SHIFTING OF KNEE: I DO NOT HAVE THE SYMPTOM
KNEE_ACTIVITY_OF_DAILY_LIVING_SUM: 47
RAW_SCORE: 47
HOW_WOULD_YOU_RATE_THE_CURRENT_FUNCTION_OF_YOUR_KNEE_DURING_YOUR_USUAL_DAILY_ACTIVITIES_ON_A_SCALE_FROM_0_TO_100_WITH_100_BEING_YOUR_LEVEL_OF_KNEE_FUNCTION_PRIOR_TO_YOUR_INJURY_AND_0_BEING_THE_INABILITY_TO_PERFORM_ANY_OF_YOUR_USUAL_DAILY_ACTIVITIES?: 90
RISE FROM A CHAIR: ACTIVITY IS MINIMALLY DIFFICULT
WALK: ACTIVITY IS MINIMALLY DIFFICULT

## 2019-08-14 ENCOUNTER — TELEPHONE (OUTPATIENT)
Dept: FAMILY MEDICINE | Facility: CLINIC | Age: 49
End: 2019-08-14

## 2019-08-14 NOTE — TELEPHONE ENCOUNTER
Panel Management Review      Patient has the following on her problem list:     Diabetes    ASA: Not Required     Last A1C  Lab Results   Component Value Date    A1C 6.5 10/31/2018    A1C 7.9 10/11/2017    A1C 5.5 08/16/2012    A1C 6.7 01/26/2011     A1C tested: Passed    Last LDL:    Lab Results   Component Value Date    CHOL 171 08/10/2012     Lab Results   Component Value Date    HDL 34 08/10/2012     Lab Results   Component Value Date    LDL 91 08/10/2012     Lab Results   Component Value Date    TRIG 227 08/10/2012     Lab Results   Component Value Date    CHOLHDLRATIO 5.0 08/10/2012     No results found for: NHDL    Is the patient on a Statin? NO             Is the patient on Aspirin? NO        Last three blood pressure readings:  BP Readings from Last 3 Encounters:   07/27/19 122/76   07/01/19 128/84   04/12/19 140/79       Date of last diabetes office visit: 10/31/18     Tobacco History:     History   Smoking Status     Never Smoker   Smokeless Tobacco     Never Used     Comment: lives in a smoke free household.         Hypertension   Last three blood pressure readings:  BP Readings from Last 3 Encounters:   07/27/19 122/76   07/01/19 128/84   04/12/19 140/79     Blood pressure: Passed    HTN Guidelines:  Less than 140/90      Composite cancer screening  Chart review shows that this patient is due/due soon for the following None  Summary:    Patient is due/failing the following:   A1C and LDL    Action needed:   Patient needs office visit for dm check.    Type of outreach:    Sent [employer] message.    Questions for provider review:    None                                                                                                                                    Gabriela Huffman MA       Chart routed to Care Team .

## 2019-08-19 ENCOUNTER — THERAPY VISIT (OUTPATIENT)
Dept: PHYSICAL THERAPY | Facility: CLINIC | Age: 49
End: 2019-08-19
Attending: FAMILY MEDICINE
Payer: COMMERCIAL

## 2019-08-19 DIAGNOSIS — G89.29 CHRONIC PAIN OF LEFT KNEE: ICD-10-CM

## 2019-08-19 DIAGNOSIS — M25.562 CHRONIC PAIN OF LEFT KNEE: ICD-10-CM

## 2019-08-19 DIAGNOSIS — M25.512 CHRONIC LEFT SHOULDER PAIN: ICD-10-CM

## 2019-08-19 DIAGNOSIS — G89.29 CHRONIC LEFT SHOULDER PAIN: ICD-10-CM

## 2019-08-19 PROCEDURE — 97110 THERAPEUTIC EXERCISES: CPT | Mod: GP | Performed by: PHYSICAL THERAPIST

## 2019-08-19 PROCEDURE — 97140 MANUAL THERAPY 1/> REGIONS: CPT | Mod: GP | Performed by: PHYSICAL THERAPIST

## 2019-08-19 PROCEDURE — 97112 NEUROMUSCULAR REEDUCATION: CPT | Mod: GP | Performed by: PHYSICAL THERAPIST

## 2019-08-22 DIAGNOSIS — I10 ESSENTIAL HYPERTENSION: ICD-10-CM

## 2019-08-22 RX ORDER — LOSARTAN POTASSIUM AND HYDROCHLOROTHIAZIDE 12.5; 5 MG/1; MG/1
1 TABLET ORAL DAILY
Qty: 90 TABLET | Refills: 0 | Status: SHIPPED | OUTPATIENT
Start: 2019-08-22 | End: 2019-11-12

## 2019-08-26 ENCOUNTER — THERAPY VISIT (OUTPATIENT)
Dept: PHYSICAL THERAPY | Facility: CLINIC | Age: 49
End: 2019-08-26
Attending: FAMILY MEDICINE
Payer: COMMERCIAL

## 2019-08-26 DIAGNOSIS — G89.29 CHRONIC PAIN OF LEFT KNEE: ICD-10-CM

## 2019-08-26 DIAGNOSIS — G89.29 CHRONIC LEFT SHOULDER PAIN: ICD-10-CM

## 2019-08-26 DIAGNOSIS — M25.562 CHRONIC PAIN OF LEFT KNEE: ICD-10-CM

## 2019-08-26 DIAGNOSIS — M25.512 CHRONIC LEFT SHOULDER PAIN: ICD-10-CM

## 2019-08-26 PROCEDURE — 97110 THERAPEUTIC EXERCISES: CPT | Mod: GP | Performed by: PHYSICAL THERAPIST

## 2019-08-26 PROCEDURE — 97112 NEUROMUSCULAR REEDUCATION: CPT | Mod: GP | Performed by: PHYSICAL THERAPIST

## 2019-08-26 PROCEDURE — 97530 THERAPEUTIC ACTIVITIES: CPT | Mod: GP | Performed by: PHYSICAL THERAPIST

## 2019-08-26 PROCEDURE — 97140 MANUAL THERAPY 1/> REGIONS: CPT | Mod: GP | Performed by: PHYSICAL THERAPIST

## 2019-09-19 ENCOUNTER — ALLIED HEALTH/NURSE VISIT (OUTPATIENT)
Dept: NURSING | Facility: CLINIC | Age: 49
End: 2019-09-19
Payer: COMMERCIAL

## 2019-09-19 VITALS
DIASTOLIC BLOOD PRESSURE: 74 MMHG | HEART RATE: 97 BPM | SYSTOLIC BLOOD PRESSURE: 141 MMHG | BODY MASS INDEX: 38.96 KG/M2 | WEIGHT: 213 LBS

## 2019-09-19 DIAGNOSIS — Z30.42 SURVEILLANCE FOR INJECTABLE MEDROXYPROGESTERONE/ESTRADIOL: Primary | ICD-10-CM

## 2019-09-19 PROCEDURE — 96372 THER/PROPH/DIAG INJ SC/IM: CPT

## 2019-09-19 RX ADMIN — MEDROXYPROGESTERONE ACETATE 150 MG: 150 INJECTION, SUSPENSION INTRAMUSCULAR at 16:09

## 2019-11-12 DIAGNOSIS — I10 ESSENTIAL HYPERTENSION: ICD-10-CM

## 2019-11-12 RX ORDER — LOSARTAN POTASSIUM AND HYDROCHLOROTHIAZIDE 12.5; 5 MG/1; MG/1
1 TABLET ORAL DAILY
Qty: 30 TABLET | Refills: 0 | Status: SHIPPED | OUTPATIENT
Start: 2019-11-12 | End: 2019-12-14

## 2019-11-13 RX ORDER — LOSARTAN POTASSIUM AND HYDROCHLOROTHIAZIDE 12.5; 5 MG/1; MG/1
TABLET ORAL
Qty: 90 TABLET | Refills: 0 | OUTPATIENT
Start: 2019-11-13

## 2019-11-27 PROBLEM — G89.29 CHRONIC PAIN OF LEFT KNEE: Status: RESOLVED | Noted: 2019-08-12 | Resolved: 2019-11-27

## 2019-11-27 PROBLEM — M25.512 CHRONIC LEFT SHOULDER PAIN: Status: RESOLVED | Noted: 2019-08-12 | Resolved: 2019-11-27

## 2019-11-27 PROBLEM — M25.562 CHRONIC PAIN OF LEFT KNEE: Status: RESOLVED | Noted: 2019-08-12 | Resolved: 2019-11-27

## 2019-11-27 PROBLEM — G89.29 CHRONIC LEFT SHOULDER PAIN: Status: RESOLVED | Noted: 2019-08-12 | Resolved: 2019-11-27

## 2019-11-27 NOTE — PROGRESS NOTES
Discharge Note    Progress reporting period is from initial evaluation date Aug 12, 2019 to Aug 26, 2019.      Please see information below for last relevant information on current status.  Patient was seen for 3 visits.    SUBJECTIVE  At last scheduled visit, patient reported improvements in both her left knee and left shoulder.   She noticed less pain with squatting and reaching overhead.     Current pain level is 0/10.     Previous pain level was 2-3/10.   Changes in function:  Yes (See Goal flowsheet attached for changes in current functional level)  Adverse reaction to treatment or activity: None    OBJECTIVE  AROM L Shldr: Flexion 125, Abd 122, ER 80,  IR/Ext L5.   Posterior capsule restrictions left shoulder.       ASSESSMENT/PLAN  Diagnosis: Left Knee Pain   STG/LTGs have been met or progress has been made towards goals:  Yes, please see goal flowsheet for most current information  Assessment of Progress: patient was meeting STGs and progressing toward LTGs  Last current status: Patient was progressing as expected   Self Management Plans:  BETTE Chapman continues to require the following intervention to meet STG and LTG's:  HEP.    Recommendations:  Following last visit on 8/26/19, patient failed to schedule any additional appointments.  No further information on patient's status is known. Will consequently discharge from physical therapy.      Please refer to the daily flowsheet for treatment today, total treatment time and time spent performing 1:1 timed codes.

## 2019-12-05 ENCOUNTER — ALLIED HEALTH/NURSE VISIT (OUTPATIENT)
Dept: NURSING | Facility: CLINIC | Age: 49
End: 2019-12-05
Payer: COMMERCIAL

## 2019-12-05 VITALS
OXYGEN SATURATION: 100 % | WEIGHT: 229 LBS | BODY MASS INDEX: 41.88 KG/M2 | HEART RATE: 104 BPM | DIASTOLIC BLOOD PRESSURE: 76 MMHG | SYSTOLIC BLOOD PRESSURE: 121 MMHG

## 2019-12-05 DIAGNOSIS — Z30.42 SURVEILLANCE FOR INJECTABLE MEDROXYPROGESTERONE/ESTRADIOL: Primary | ICD-10-CM

## 2019-12-05 PROCEDURE — 96372 THER/PROPH/DIAG INJ SC/IM: CPT

## 2019-12-05 RX ORDER — MEDROXYPROGESTERONE ACETATE 150 MG/ML
150 INJECTION, SUSPENSION INTRAMUSCULAR ONCE
Status: COMPLETED | OUTPATIENT
Start: 2019-12-05 | End: 2019-12-05

## 2019-12-05 RX ADMIN — MEDROXYPROGESTERONE ACETATE 150 MG: 150 INJECTION, SUSPENSION INTRAMUSCULAR at 18:40

## 2019-12-06 NOTE — PROGRESS NOTES
Clinic Administered Medication Documentation    MEDICATION LIST:   Depo Provera Documentation    Prior to injection, verified patient identity using patient's name and date of birth. Medication was administered. Please see MAR and medication order for additional information. Patient instructed to remain in clinic for 15 minutes.    BP: 121/76    LAST PAP/EXAM:   Lab Results   Component Value Date    PAP NIL 10/11/2017     URINE HCG:not indicated    NEXT INJECTION DUE: 2/20/20 - 3/5/20    Was entire vial of medication used? Yes  Vial/Syringe: Single dose vial  Expiration Date:  3/2021  Elzbieta Gregory MA

## 2019-12-14 DIAGNOSIS — I10 ESSENTIAL HYPERTENSION: ICD-10-CM

## 2019-12-16 RX ORDER — LOSARTAN POTASSIUM AND HYDROCHLOROTHIAZIDE 12.5; 5 MG/1; MG/1
TABLET ORAL
Qty: 30 TABLET | Refills: 0 | Status: SHIPPED | OUTPATIENT
Start: 2019-12-16 | End: 2020-02-05

## 2020-02-04 DIAGNOSIS — I10 ESSENTIAL HYPERTENSION: ICD-10-CM

## 2020-02-05 RX ORDER — LOSARTAN POTASSIUM AND HYDROCHLOROTHIAZIDE 12.5; 5 MG/1; MG/1
TABLET ORAL
Qty: 30 TABLET | Refills: 0 | Status: SHIPPED | OUTPATIENT
Start: 2020-02-05 | End: 2020-02-21

## 2020-02-05 NOTE — TELEPHONE ENCOUNTER
"Patient has upcoming/ pending appointment:    Next 5 appointments (look out 90 days)    Feb 24, 2020  3:40 PM CST  [facility]YS[facility] with [facility] [id number] [facility] [address] [phone]          Rx refilled per [facility] refill protocol.    Bethanie CHAON, RN      Requested Prescriptions   Signed Prescriptions Disp Refills    losartan-hydrochlorothiazide (HYZAAR) 50-12.5 MG tablet 30 tablet 0     Sig: TAKE 1 TABLET BY MOUTH DAILY       Angiotensin-II Receptors Failed - 2/4/2020 12:05 PM        Failed - Normal serum creatinine on file in past 12 months     Recent Labs   Lab Test 10/31/18  1714   CR 0.65             Failed - Normal serum potassium on file in past 12 months     Recent Labs   Lab Test 10/31/18  1714   POTASSIUM 4.0                    Passed - Last blood pressure under 140/90 in past 12 months     BP Readings from Last 3 Encounters:   12/05/19 121/76   09/19/19 (!) 141/74   07/27/19 122/76                 Passed - Recent (12 mo) or future (30 days) visit within the authorizing provider's specialty     Patient has had an office visit with the authorizing provider or a provider within the authorizing providers department within the previous 12 mos or has a future within next 30 days. See \"Patient Info\" tab in inbasket, or \"Choose Columns\" in Meds & Orders section of the refill encounter.              Passed - Medication is active on med list        Passed - Patient is age 18 or older        Passed - No active pregnancy on record        Passed - No positive pregnancy test in past 12 months          "

## 2020-02-20 NOTE — PROGRESS NOTES
SUBJECTIVE:   CC: Adela Ernandez is an 49 year old woman who presents for preventive health visit.     Healthy Habits:     Getting at least 3 servings of Calcium per day:  Yes    Bi-annual eye exam:  Yes    Dental care twice a year:  Yes    Sleep apnea or symptoms of sleep apnea:  Daytime drowsiness    Diet:  Regular (no restrictions)    Frequency of exercise:  4-5 days/week    Duration of exercise:  30-45 minutes    Taking medications regularly:  Yes    Medication side effects:  None    PHQ-2 Total Score: 0    Additional concerns today:  No    Pt with diabetes diagnosed in 2018. No further follow-up for over a year  Not checking BSs. But she has lost weight.   Will update labs  Is on hyzaar for her BP which is well controlled  No statin yet  She is not fasting today but works 7 days per week so cannot come back fasting  Recommended aspirin if a1c still comes back elevated.   Does see eye doctor yearly    Is agreeable to mammogram, pneumo 23 since has diabetes  Is overdue for depo and no concern for pregnancy. UPT negative        Today's PHQ-2 Score:   PHQ-2 ( 1999 [employer]) 2/24/2020   Q1: Little interest or pleasure in doing things 0   Q2: Feeling down, depressed or hopeless 0   PHQ-2 Score 0   Q1: Little interest or pleasure in doing things Not at all   Q2: Feeling down, depressed or hopeless Not at all   PHQ-2 Score 0       Abuse: Current or Past(Physical, Sexual or Emotional)- No  Do you feel safe in your environment? Yes        Social History     Tobacco Use     Smoking status: Never Smoker     Smokeless tobacco: Never Used     Tobacco comment: lives in a smoke free household.   Substance Use Topics     Alcohol use: Yes     Alcohol/week: 0.0 standard drinks     Comment: rare         Alcohol Use 2/24/2020   Prescreen: >3 drinks/day or >7 drinks/week? No   Prescreen: >3 drinks/day or >7 drinks/week? -       Reviewed orders with patient.  Reviewed health maintenance and updated orders accordingly - Yes  Lab work  is in process    Mammogram Screening: Patient under age 50, mutual decision reflected in health maintenance.      Pertinent mammograms are reviewed under the imaging tab.  History of abnormal Pap smear: NO - age 30-65 PAP every 5 years with negative HPV co-testing recommended  PAP / HPV Latest Ref Rng & Units 10/11/2017 1/23/2015 8/10/2012   PAP - NIL Negative NIL   HPV 16 DNA NEG:Negative Negative - -   HPV 18 DNA NEG:Negative Negative - -   OTHER HR HPV NEG:Negative Negative - -     Reviewed and updated as needed this visit by clinical staff  Tobacco  Allergies  Med Hx  Surg Hx  Fam Hx  Soc Hx        Reviewed and updated as needed this visit by Provider            Review of Systems   Constitutional: Negative for chills and fever.   HENT: Negative for congestion, ear pain, hearing loss and sore throat.    Eyes: Negative for pain and visual disturbance.   Respiratory: Negative for cough and shortness of breath.    Cardiovascular: Negative for chest pain, palpitations and peripheral edema.   Gastrointestinal: Negative for abdominal pain, constipation, diarrhea, heartburn, hematochezia and nausea.   Breasts:  Negative for tenderness, breast mass and discharge.   Genitourinary: Negative for dysuria, frequency, genital sores, hematuria, pelvic pain, urgency, vaginal bleeding and vaginal discharge.   Musculoskeletal: Negative for arthralgias, joint swelling and myalgias.   Skin: Negative for rash.   Neurological: Positive for headaches. Negative for dizziness, weakness and paresthesias.   Psychiatric/Behavioral: Negative for mood changes. The patient is not nervous/anxious.      CONSTITUTIONAL: NEGATIVE for fever, chills, change in weight  INTEGUMENTARU/SKIN: NEGATIVE for worrisome rashes, moles or lesions  EYES: NEGATIVE for vision changes or irritation  ENT: NEGATIVE for ear, mouth and throat problems  RESP: NEGATIVE for significant cough or SOB  BREAST: NEGATIVE for masses, tenderness or discharge  CV: NEGATIVE  "for chest pain, palpitations or peripheral edema  GI: NEGATIVE for nausea, abdominal pain, heartburn, or change in bowel habits  : NEGATIVE for unusual urinary or vaginal symptoms. Periods are regular.  MUSCULOSKELETAL: NEGATIVE for significant arthralgias or myalgia  NEURO: NEGATIVE for weakness, dizziness or paresthesias  PSYCHIATRIC: NEGATIVE for changes in mood or affect     OBJECTIVE:   /73   Pulse 82   Temp 98.8  F (37.1  C) (Oral)   Ht 1.575 m (5' 2\")   Wt 93.4 kg (206 lb)   BMI 37.68 kg/m    Physical Exam  GENERAL: healthy, alert and no distress  EYES: Eyes grossly normal to inspection, PERRL and conjunctivae and sclerae normal  HENT: ear canals and TM's normal, nose and mouth without ulcers or lesions  NECK: no adenopathy, no asymmetry, masses, or scars and thyroid normal to palpation  RESP: lungs clear to auscultation - no rales, rhonchi or wheezes  BREAST: normal without masses, tenderness or nipple discharge and no palpable axillary masses or adenopathy  CV: regular rate and rhythm, normal S1 S2, no S3 or S4, no murmur, click or rub, no peripheral edema and peripheral pulses strong  ABDOMEN: soft, nontender, no hepatosplenomegaly, no masses and bowel sounds normal  MS: no gross musculoskeletal defects noted, no edema  SKIN: no suspicious lesions or rashes  NEURO: Normal strength and tone, mentation intact and speech normal  PSYCH: mentation appears normal, affect normal/bright    Diagnostic Test Results:  Labs reviewed in Epic    ASSESSMENT/PLAN:   1. Routine general medical examination at a health care facility      2. Type 2 diabetes mellitus without complication, without long-term current use of insulin (H)  Recheck, overdue as has not been seen in over a year  - Hemoglobin A1c  - Lipid panel reflex to direct LDL Non-fasting  - Albumin Random Urine Quantitative with Creat Ratio  - Basic metabolic panel  - FOOT EXAM    3. Morbid obesity (H)  Encourage continued weight loss    4. High " "triglycerides  Consider statin    5. Essential hypertension  At goal on meds, willrefill  - losartan-hydrochlorothiazide (HYZAAR) 50-12.5 MG tablet; Take 1 tablet by mouth daily  Dispense: 90 tablet; Refill: 1    6. Need for pneumococcal vaccine  given  - Pneumococcal vaccine 23 valent PPSV23  (Pneumovax) [78065]    7. Encounter for screening mammogram for breast cancer  Pt to schedule if it is covered  - *MA Screening Digital Bilateral; Future    8. Encounter for surveillance of injectable contraceptive  given  - medroxyPROGESTERone (DEPO-PROVERA) syringe 150 mg  - HCG qualitative urine    COUNSELING:  Reviewed preventive health counseling, as reflected in patient instructions       Regular exercise       Healthy diet/nutrition       Vision screening    Estimated body mass index is 37.68 kg/m  as calculated from the following:    Height as of this encounter: 1.575 m (5' 2\").    Weight as of this encounter: 93.4 kg (206 lb).    Weight management plan: Discussed healthy diet and exercise guidelines     reports that she has never smoked. She has never used smokeless tobacco.      Counseling Resources:  ATP IV Guidelines  Pooled Cohorts Equation Calculator  Breast Cancer Risk Calculator  FRAX Risk Assessment  ICSI Preventive Guidelines  Dietary Guidelines for Americans, 2010  USDA's MyPlate  ASA Prophylaxis  Lung CA Screening    Gena Marcos MD  [facility]  "

## 2020-02-24 ENCOUNTER — HEALTH MAINTENANCE LETTER (OUTPATIENT)
Age: 50
End: 2020-02-24

## 2020-02-24 ENCOUNTER — OFFICE VISIT (OUTPATIENT)
Dept: FAMILY MEDICINE | Facility: CLINIC | Age: 50
End: 2020-02-24
Payer: COMMERCIAL

## 2020-02-24 VITALS
HEART RATE: 82 BPM | WEIGHT: 206 LBS | TEMPERATURE: 98.8 F | DIASTOLIC BLOOD PRESSURE: 73 MMHG | SYSTOLIC BLOOD PRESSURE: 126 MMHG | HEIGHT: 62 IN | BODY MASS INDEX: 37.91 KG/M2

## 2020-02-24 DIAGNOSIS — E66.01 MORBID OBESITY (H): ICD-10-CM

## 2020-02-24 DIAGNOSIS — Z00.00 ROUTINE GENERAL MEDICAL EXAMINATION AT A HEALTH CARE FACILITY: Primary | ICD-10-CM

## 2020-02-24 DIAGNOSIS — E78.1 HIGH TRIGLYCERIDES: ICD-10-CM

## 2020-02-24 DIAGNOSIS — Z23 NEED FOR PNEUMOCOCCAL VACCINE: ICD-10-CM

## 2020-02-24 DIAGNOSIS — Z12.31 ENCOUNTER FOR SCREENING MAMMOGRAM FOR BREAST CANCER: ICD-10-CM

## 2020-02-24 DIAGNOSIS — I10 ESSENTIAL HYPERTENSION: ICD-10-CM

## 2020-02-24 DIAGNOSIS — Z30.42 ENCOUNTER FOR SURVEILLANCE OF INJECTABLE CONTRACEPTIVE: ICD-10-CM

## 2020-02-24 DIAGNOSIS — E11.9 TYPE 2 DIABETES MELLITUS WITHOUT COMPLICATION, WITHOUT LONG-TERM CURRENT USE OF INSULIN (H): ICD-10-CM

## 2020-02-24 LAB
ANION GAP SERPL CALCULATED.3IONS-SCNC: 7 MMOL/L (ref 3–14)
BUN SERPL-MCNC: 13 MG/DL (ref 7–30)
CALCIUM SERPL-MCNC: 9.1 MG/DL (ref 8.5–10.1)
CHLORIDE SERPL-SCNC: 107 MMOL/L (ref 94–109)
CHOLEST SERPL-MCNC: 152 MG/DL
CO2 SERPL-SCNC: 26 MMOL/L (ref 20–32)
CREAT SERPL-MCNC: 0.7 MG/DL (ref 0.52–1.04)
GFR SERPL CREATININE-BSD FRML MDRD: >90 ML/MIN/{1.73_M2}
GLUCOSE SERPL-MCNC: 115 MG/DL (ref 70–99)
HBA1C MFR BLD: 6.1 % (ref 0–5.6)
HCG UR QL: NEGATIVE
HDLC SERPL-MCNC: 35 MG/DL
LDLC SERPL CALC-MCNC: 76 MG/DL
NONHDLC SERPL-MCNC: 117 MG/DL
POTASSIUM SERPL-SCNC: 4.5 MMOL/L (ref 3.4–5.3)
SODIUM SERPL-SCNC: 140 MMOL/L (ref 133–144)
TRIGL SERPL-MCNC: 204 MG/DL

## 2020-02-24 PROCEDURE — 82043 UR ALBUMIN QUANTITATIVE: CPT | Performed by: FAMILY MEDICINE

## 2020-02-24 PROCEDURE — 99213 OFFICE O/P EST LOW 20 MIN: CPT | Mod: 25 | Performed by: FAMILY MEDICINE

## 2020-02-24 PROCEDURE — 96372 THER/PROPH/DIAG INJ SC/IM: CPT | Performed by: FAMILY MEDICINE

## 2020-02-24 PROCEDURE — 90471 IMMUNIZATION ADMIN: CPT | Performed by: FAMILY MEDICINE

## 2020-02-24 PROCEDURE — 99207 C FOOT EXAM  NO CHARGE: CPT | Mod: 25 | Performed by: FAMILY MEDICINE

## 2020-02-24 PROCEDURE — 99396 PREV VISIT EST AGE 40-64: CPT | Mod: 25 | Performed by: FAMILY MEDICINE

## 2020-02-24 PROCEDURE — 90732 PPSV23 VACC 2 YRS+ SUBQ/IM: CPT | Performed by: FAMILY MEDICINE

## 2020-02-24 PROCEDURE — 83036 HEMOGLOBIN GLYCOSYLATED A1C: CPT | Performed by: FAMILY MEDICINE

## 2020-02-24 PROCEDURE — 80048 BASIC METABOLIC PNL TOTAL CA: CPT | Performed by: FAMILY MEDICINE

## 2020-02-24 PROCEDURE — 81025 URINE PREGNANCY TEST: CPT | Performed by: FAMILY MEDICINE

## 2020-02-24 PROCEDURE — 80061 LIPID PANEL: CPT | Performed by: FAMILY MEDICINE

## 2020-02-24 PROCEDURE — 36415 COLL VENOUS BLD VENIPUNCTURE: CPT | Performed by: FAMILY MEDICINE

## 2020-02-24 RX ORDER — MEDROXYPROGESTERONE ACETATE 150 MG/ML
150 INJECTION, SUSPENSION INTRAMUSCULAR
Status: ACTIVE | OUTPATIENT
Start: 2020-02-24

## 2020-02-24 RX ORDER — LOSARTAN POTASSIUM AND HYDROCHLOROTHIAZIDE 12.5; 5 MG/1; MG/1
1 TABLET ORAL DAILY
Qty: 90 TABLET | Refills: 1 | Status: SHIPPED | OUTPATIENT
Start: 2020-02-24 | End: 2020-08-10

## 2020-02-24 RX ADMIN — MEDROXYPROGESTERONE ACETATE 150 MG: 150 INJECTION, SUSPENSION INTRAMUSCULAR at 16:27

## 2020-02-24 ASSESSMENT — ENCOUNTER SYMPTOMS
DIZZINESS: 0
COUGH: 0
PARESTHESIAS: 0
SHORTNESS OF BREATH: 0
HEARTBURN: 0
HEADACHES: 1
HEMATURIA: 0
BREAST MASS: 0
PALPITATIONS: 0
NERVOUS/ANXIOUS: 0
DIARRHEA: 0
CHILLS: 0
FEVER: 0
WEAKNESS: 0
NAUSEA: 0
CONSTIPATION: 0
JOINT SWELLING: 0
FREQUENCY: 0
MYALGIAS: 0
ARTHRALGIAS: 0
ABDOMINAL PAIN: 0
EYE PAIN: 0
DYSURIA: 0
HEMATOCHEZIA: 0
SORE THROAT: 0

## 2020-02-24 ASSESSMENT — MIFFLIN-ST. JEOR: SCORE: 1517.66

## 2020-02-24 NOTE — NURSING NOTE
Clinic Administered Medication Documentation      Depo Provera Documentation    URINE HCG: not indicated    Depo-Provera Standing Order inclusion/exclusion criteria reviewed.       BP: 126/73  LAST PAP/EXAM:   Lab Results   Component Value Date    PAP NIL 10/11/2017       Prior to injection, verified patient identity using patient's name and date of birth. Medication was administered. Please see MAR and medication order for additional information.     Was entire vial of medication used? Yes  Vial/Syringe: Single dose vial  Expiration Date:  Mar 2021     Patient instructed to report any adverse reaction to staff immediately .  NEXT INJECTION DUE: 5/11/20 - 5/25/20      Prior to immunization administration, verified patients identity using patient s name and date of birth. Please see Immunization Activity for additional information.     Screening Questionnaire for Adult Immunization    Are you sick today?   No   Do you have allergies to medications, food, a vaccine component or latex?   No   Have you ever had a serious reaction after receiving a vaccination?   No   Do you have a long-term health problem with heart, lung, kidney, or metabolic disease (e.g., diabetes), asthma, a blood disorder, no spleen, complement component deficiency, a cochlear implant, or a spinal fluid leak?  Are you on long-term aspirin therapy?   No   Do you have cancer, leukemia, HIV/AIDS, or any other immune system problem?   No   Do you have a parent, brother, or sister with an immune system problem?   No   In the past 3 months, have you taken medications that affect  your immune system, such as prednisone, other steroids, or anticancer drugs; drugs for the treatment of rheumatoid arthritis, Crohn s disease, or psoriasis; or have you had radiation treatments?   No   Have you had a seizure, or a brain or other nervous system problem?   No   During the past year, have you received a transfusion of blood or blood    products, or been given  immune (gamma) globulin or antiviral drug?   No   For women: Are you pregnant or is there a chance you could become       pregnant during the next month?   No   Have you received any vaccinations in the past 4 weeks?   No     Immunization questionnaire answers were all negative.               Screening performed by Gabriela Huffman MA on 2/24/2020 at 4:32 PM.

## 2020-02-25 LAB
CREAT UR-MCNC: 55 MG/DL
MICROALBUMIN UR-MCNC: 5 MG/L
MICROALBUMIN/CREAT UR: 9.91 MG/G CR (ref 0–25)

## 2020-06-29 ENCOUNTER — TELEPHONE (OUTPATIENT)
Dept: FAMILY MEDICINE | Facility: CLINIC | Age: 50
End: 2020-06-29

## 2020-06-29 DIAGNOSIS — Z30.42 ENCOUNTER FOR SURVEILLANCE OF INJECTABLE CONTRACEPTIVE: Primary | ICD-10-CM

## 2020-06-29 NOTE — TELEPHONE ENCOUNTER
Patient is late for depo. She does not want a Dr appt. And does not want a urine test. Clinic called to change the time and patient could not do the time. She has not had any sex. Can she get her depo by seeing a nurse? Ok to leave a detailed message.

## 2020-06-30 RX ORDER — MEDROXYPROGESTERONE ACETATE 150 MG/ML
150 INJECTION, SUSPENSION INTRAMUSCULAR ONCE
Status: ACTIVE | OUTPATIENT
Start: 2020-06-30

## 2020-06-30 NOTE — TELEPHONE ENCOUNTER
I have ordered a one time injection. She is due to be seen in AUgust for diabetes check.   When she follows up for that , I can order enough for a year.     Gena Henriquez MD

## 2020-07-06 NOTE — TELEPHONE ENCOUNTER
HARSH on patient personal cell # [phone], gave providers message below stating to make an appointment for Depo now with ancillary nurse. Then needs Diabetic check due in August to get further Depo ordered.  LIBERTY Zaragoza

## 2020-07-26 DIAGNOSIS — I10 ESSENTIAL HYPERTENSION: ICD-10-CM

## 2020-07-27 RX ORDER — LOSARTAN POTASSIUM 50 MG/1
TABLET ORAL
Qty: 90 TABLET | OUTPATIENT
Start: 2020-07-27

## 2020-07-27 RX ORDER — HYDROCHLOROTHIAZIDE 12.5 MG/1
TABLET ORAL
Qty: 90 TABLET | OUTPATIENT
Start: 2020-07-27

## 2020-08-10 ENCOUNTER — MYC REFILL (OUTPATIENT)
Dept: FAMILY MEDICINE | Facility: CLINIC | Age: 50
End: 2020-08-10

## 2020-08-10 DIAGNOSIS — I10 ESSENTIAL HYPERTENSION: ICD-10-CM

## 2020-08-10 RX ORDER — LOSARTAN POTASSIUM AND HYDROCHLOROTHIAZIDE 12.5; 5 MG/1; MG/1
1 TABLET ORAL DAILY
Qty: 90 TABLET | Refills: 0 | Status: SHIPPED | OUTPATIENT
Start: 2020-08-10 | End: 2020-10-30

## 2020-10-30 DIAGNOSIS — I10 ESSENTIAL HYPERTENSION: ICD-10-CM

## 2020-10-30 RX ORDER — LOSARTAN POTASSIUM AND HYDROCHLOROTHIAZIDE 12.5; 5 MG/1; MG/1
1 TABLET ORAL DAILY
Qty: 90 TABLET | Refills: 0 | Status: SHIPPED | OUTPATIENT
Start: 2020-10-30 | End: 2021-01-26

## 2020-12-13 ENCOUNTER — HEALTH MAINTENANCE LETTER (OUTPATIENT)
Age: 50
End: 2020-12-13

## 2021-01-04 NOTE — TELEPHONE ENCOUNTER
[facility] encounter  HEMATOLOGY/ONCOLOGY OFFICE VISIT    CHIEF COMPLAINT:   Cancer      HISTORY OF PRESENT ILLNESS:  Nancy Thume is a 79 year old female who presents for evaluation of IgA Kappa Multiple Myeloma with 20% monoclonal plasma cells in the bone marrow, with normal cytogenetics, creatinine, calcium, hemoglobin, PET scan, albumin and LDH diagnosed on 12/11/2018.  Her Beta 2 MG was 3.9.      On 12/09/2019, her kappa/lambda ratio was noted to have increased to 150. It was confirmed on 12/16/2019.  Repeat bone marrow aspiration and biopsy on 12/23/2019 showed no increase in the monoclonal plasma cells which remained at 20%.  Cytogenetics were normal.  Monosomal 13 was noted on myeloma fish panel.  PET scan on 01/07/2020 showed no definite involvement of any of her bones with myeloma.    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  She began treatment with Revlimid 10 mg 3 weeks on 1 week off on 01/27/2020.    She takes Questran half a packet per day.  With that, her diarrhea has improved significantly.  She does not have watery stools every day.   Her appetite is excellent.  She has gained some weight.   She denies any fevers, chills or night sweats.  She has no shortness of breath with activity.  She does not report any dizziness on standing.  She has not had any tingling or numbness in her hands or feet.    She has not required any transfusion in the recent past, nor has she had any infections or hospitalizations.     Since her last visit, Nancy Thume has had no infections or hospitalizations.      Please see the Review of Systems.  Positives are highlighted.     PAST HISTORIES:  Problem List           ICD-10-CM Noted       Oncology Problems    Smoldering multiple myeloma (CMS/HCC) C90.00 1/9/2020             Cancer Staging Summary for Thume, Nancy     None        Past Medical History:   Diagnosis Date   • COPD (chronic obstructive pulmonary disease) (CMS/HCC)    •  Medication refilled per RN protocol.  Need to keep upcoming appointment for further refills  Left voicemail message with above information advise that hypertension   Requires 6 mos appointment with labs.  Advise to discuss at upcoming appointment for further plan.  Rhonda Abraham RN          DJD (degenerative joint disease)    • GERD (gastroesophageal reflux disease)    • Hepatitis B 1970    Approximate date, thought to be occupational   • Hypothyroidism    • Macular degeneration 1992   • Migraine with aura    • Multiple myeloma (CMS/HCC)    • Osteoporosis, unspecified 08/02/2011   • Seizure disorder (CMS/HCC) 1958    Onset in adolescence, on seizure meds since   • Seizures (CMS/HCC)      Past Surgical History:   Procedure Laterality Date   • Breast surgery      5 benign breast biopsies 1970s and 80s   • Colonoscopy diagnostic  06/11/2009    Colonoscopy, Dx   • Eye exam Bilateral 05/26/2020    macular degeneration OU, Cataract OU   • Hysterectomy  2003    AUB   • Occult blood test tube      Normal, Dr. Hilario SCHWARTZ     Family History   Problem Relation Age of Onset   • Cancer, Ovarian Mother 82        Ovrian   • High blood pressure Father    • Cancer Father         Melanoma   • Early death Sister    • High cholesterol Brother      Social History     Tobacco Use   • Smoking status: Former Smoker     Types: Cigarettes     Quit date: 12/13/2005     Years since quitting: 15.0   • Smokeless tobacco: Never Used   Substance Use Topics   • Alcohol use: No     Frequency: Never     Drinks per session: 1 or 2     Binge frequency: Never   • Drug use: No       MEDICATIONS / ALLERGIES:  Current Outpatient Medications   Medication Sig Dispense Refill   • lenalidomide (REVLIMID) 10 MG capsule Take 1 capsule by mouth daily. Take daily for 21 days followed by 7 days off for 28day cycle. [employer] # [id number] on 11/30/2020. 21 capsule 0   • lenalidomide (REVLIMID) 10 MG capsule Take 1 capsule by mouth daily. Take daily for 21 days followed by 7 days off for 28day cycle. [employer] # [id number] on 11/10/2020. 21 capsule 0   • alendronate (FOSAMAX) 70 MG tablet Take 1 tablet by mouth every 7 days. 12 tablet 3   • phenyTOIN (DILANTIN) 100 MG ER capsule Take 1 capsule by mouth 2 times daily. 180 capsule 3   • lenalidomide (REVLIMID) 10  MG capsule Take 1 capsule by mouth daily. Take daily for 21 days followed by 7 days off for 28day cycle. [employer] # [id number] on 10/2/2020. 21 capsule 0   • megestrol (Megace ES) 625 MG/5ML suspension Take 5 mLs by mouth daily. 150 mL 3   • albuterol 108 (90 Base) MCG/ACT inhaler Inhale 2 puffs into the lungs every 4 hours as needed for Shortness of Breath or Wheezing. 1 Inhaler 5   • ferrous gluconate 324 (37.5 Fe) MG tablet Take 1 tablet by mouth 2 times daily. 60 tablet 3   • ascorbic acid (VITAMIN C) 250 MG tablet Take 1 tablet by mouth daily. 30 tablet 3   • cyclobenzaprine (FLEXERIL) 10 MG tablet Take 1 tablet by mouth daily. 30 tablet 2   • cholestyramine/aspartame (QUESTRAN LIGHT) 4 g packet Take 1 packet by mouth daily. 30 packet 3   • baclofen (LIORESAL) 20 MG tablet Take 1 tablet by mouth every 8 hours. Indications: Muscle Spasticity 90 tablet 1   • levothyroxine 50 MCG tablet Take 1 tablet by mouth daily. 90 tablet 3   • prochlorperazine (COMPAZINE) 10 MG tablet Take 1 tablet by mouth every 6 hours as needed for Nausea or Vomiting. 30 tablet 5   • erythromycin (ILOTYCIN) ophthalmic ointment APPLY OINTMENT INTO EACH EYE AT BEDTIME AS NEEDED  1   • olopatadine (PATANOL) 0.1 % ophthalmic solution INSTILL ONE DROP INTO EACH EYE ONCE DAILY 5 mL 12   • ibuprofen (MOTRIN) 200 MG tablet Take 200 mg by mouth as needed for Pain.     • fluticasone (CUTIVATE) 0.005 % ointment Use as directed 60 g 3   • Dentifrices (BIOTENE DRY MOUTH CARE DT)      • acetaminophen (TYLENOL) 325 MG tablet Take 1 tablet by mouth every 4 hours as needed for Pain. 100 tablet 0   • omeprazole (PRILOSEC) 20 MG capsule Take 20 mg by mouth daily.     • melatonin 3 MG Take 3 mg by mouth nightly.     • Glucosamine-Chondroitin (OSTEO BI-FLEX REGULAR STRENGTH PO) Take 1 tablet by mouth daily.     • Multiple Vitamins-Minerals (OCUVITE PO) Take 1 tablet by mouth daily.     • docusate sodium (COLACE) 100 MG capsule Take 100 mg by mouth as needed for  Constipation.     • Loratadine (CLARITIN PO) Take 5 mg by mouth.     • fexofenadine (ALLEGRA) 180 MG tablet Take 180 mg by mouth daily.     • CALCIUM CARBONATE-VITAMIN D PO Take 1 tablet by mouth.       No current facility-administered medications for this visit.      ALLERGIES:   Allergen Reactions   • Codeine      Gallbladder reaction    • Demerol Other (See Comments)   • Sulfa Antibiotics    • Seasonal Other (See Comments)     Congestion        Review of systems:  Josefina Finney MA  1/4/2021  3:29 PM  Sign when Signing Visit  Nancy Thume is a 79 year old female here for  Chief Complaint   Patient presents with   • Cancer     Denies latex allergy or sensitivity.    Medication verified, no changes.  PCP and Pharmacy verified.    Social History     Tobacco Use   Smoking Status Former Smoker   • Types: Cigarettes   • Quit date: 12/13/2005   • Years since quitting: 15.0   Smokeless Tobacco Never Used     Advance Directives Filed: Yes    ECOG:   ECOG [01/04/21 1526]   ECOG Performance Status 0       Height: No.  Ht Readings from Last 1 Encounters:   01/04/21 5' 6\" (1.676 m)     Weight:Yes, shoes on.  Wt Readings from Last 3 Encounters:   01/04/21 53.1 kg   11/10/20 51.9 kg   10/13/20 53.1 kg       BMI: Body mass index is 18.88 kg/m².    REVIEW OF SYSTEMS  GENERAL:  Patient denies headache, fevers, chills, night sweats, excessive fatigue, change in appetite, weight loss, dizziness  ALLERGIC/IMMUNOLOGIC: Verified allergies: Yes  EYES:  Patient denies significant visual difficulties, double vision, blurred vision  ENT/MOUTH: Patient denies problems with hearing, sore throat, sinus drainage, mouth sores  ENDOCRINE:  Patient denies diabetes, thyroid disease, hormone replacement, hot flashes  HEMATOLOGIC/LYMPHATIC: Patient denies easy bruising, bleeding, tender lymph nodes, swollen lymph nodes  BREASTS: Patient denies abnormal masses of breast, nipple discharge, pain  RESPIRATORY:  Patient denies lung pain with  breathing, cough, coughing up blood, shortness of breath  CARDIOVASCULAR:  Patient denies anginal chest pain, palpitations, shortness of breath when lying flat, peripheral edema  GASTROINTESTINAL: Patient denies abdominal pain , nausea, vomiting, diarrhea, GI bleeding, constipation, change in bowel habits, heartburn, sensation of feeling full, difficulty swallowing  : Patient denies abnormal genital masses, blood in the urine, frequency, urgency, burning with urination, hesitancy, incontinence, vaginal bleeding, discharge   MUSCULOSKELETAL:  Patient denies joint pain, bone pain, joint swelling, redness, decreased range of motion  SKIN:  Patient denies chronic rashes, inflammation, ulcerations, skin changes, itching  NEUROLOGIC:  Patient denies loss of balance, areas of focal weakness, abnormal gait, sensory problems, numbness, tingling  PSYCHIATRIC: Patient denies insomnia, depression, anxiety      PHYSICAL EXAM:  Vital Signs:   Oncology Encounter Vitals [01/04/21 1525]   ONC OP Encounter Vitals Group      BP (!) 158/69      Heart Rate 69      Resp 16      Temp 98 °F (36.7 °C)      Temp src Temporal      SpO2 95 %      Weight 117 lb (53.1 kg)      Height 5' 6\" (1.676 m)      Pain Score       Pain Location       Pain Education?       BSA (Calculated - m2) - Ivan & Ivan 1.59      BMI (Calculated) 18.88     ECOG Performance Status:   ECOG [01/04/21 1526]   ECOG Performance Status 0     General: The patient is alert, well-developed, well-nourished, no distress.  Skin: Warm, normal color, normal texture, normal turgor and without rash.  Head: Normocephalic, atraumatic.  Neck: Supple  Neurologic: Motor strength normal. Coordination normal. No tremor noted.  Psychiatric: Cooperative. Appropriate mood and affect. Normal judgment.         DATA REVIEWED:  Laboratory Data:  Recent Labs   Lab 12/30/20  1348   WBC 4.5   RBC 2.96*   HGB 10.3*   HCT 30.5*   .0*      Absolute Neutrophils 2.4   Absolute  Lymphocytes 1.4   Absolute Monocytes 0.3   Absolute Eosinophils  0.4   Absolute Basophils 0.0     Recent Labs   Lab 12/30/20  1348   Glucose 119*   Sodium 137   Potassium 3.9   Chloride 103   Carbon Dioxide 22   BUN 22*   Creatinine 0.93   Calcium 8.0*   Protein, Total 6.7  7.0   Albumin 3.3*   GOT/AST 24   Alkaline Phosphatase 89   GPT 25     Recent Labs   Lab 12/30/20  1348   Anion Gap 16   Globulin 3.7   LD, Total 125   Bilirubin, Total 0.2       Results for THUME, NANCY (MRN [id number]) as of 1/4/2021 15:46   Ref. Range 10/8/2020 14:28 11/3/2020 14:13 12/30/2020 13:48   Kappa/ Lambda Ratio Latest Ref Range: 0.26 - 1.65  32.83 (H) 34.40 (H) 36.92 (H)   Kappa, Free Latest Ref Range: 0.33 - 1.94 mg/dL 69.27 (H) 66.40 (H) 66.82 (H)   Lambda, Free Latest Ref Range: 0.57 - 2.63 mg/dL 2.11 1.93 1.81   M PROTEIN Latest Ref Range: <=0.0 g/dL 0.8 (H) 0.8 (H) 0.8 (H)   M PROTEIN 2 Latest Ref Range: <=0.0 g/dL 0.1 (H) 0.2 (H) 0.2 (H)             Imaging Studies: NONE    ASSESSMENT AND PLAN:  ASSESSMENT: Smoldering multiple myeloma (CMS/HCC)  (primary encounter diagnosis)  Comment:  On Revlimid due to high-risk features beginning 01/27/2020.  Discussed oral chemotherapy adherence and side effects with patient.  Patient is taking medication as prescribed.   No grade 3/4 toxicity.  No evidence of thrombosis or hemorrhage.   Her kappa light chains are back to where they have been in the recent past  Plan:  Continue 10 mg daily for 3 weeks followed by 1 week off.  No dose adjustment.  RTC 8 weeks.  Labs prior to visit.    Diarrhea, unspecified type  Comment: Grade 1.  She is following a regimen of Questran which seems to be keeping it under control.   Plan: Continue present management.     Anemia, unspecified type  Comment: H/H has declined somewhat, despite Fe tablets.  Anemia could partly be from Revlimid.  Based on today's counts, the hemoglobin is not low enough to compromise end organ perfusion and therefore,  PRBC (Packed  Red Blood Cell)  transfusion is not needed.   Plan: Verify iron stores prior to next visit.     The patient, indicated understanding of the diagnosis and agreed with the plan of care. The patient is encouraged to call between now and next visit with any problems, questions or concerns that arise.    Thank you for allowing me to participate in her care.    Luke Figueroa MD  Hematology/Oncology  [facility] [place][note 3]

## 2021-01-26 ENCOUNTER — MYC REFILL (OUTPATIENT)
Dept: FAMILY MEDICINE | Facility: CLINIC | Age: 51
End: 2021-01-26

## 2021-01-26 DIAGNOSIS — I10 ESSENTIAL HYPERTENSION: ICD-10-CM

## 2021-01-26 RX ORDER — LOSARTAN POTASSIUM AND HYDROCHLOROTHIAZIDE 12.5; 5 MG/1; MG/1
1 TABLET ORAL DAILY
Qty: 90 TABLET | Refills: 0 | Status: CANCELLED | OUTPATIENT
Start: 2021-01-26

## 2021-01-26 RX ORDER — LOSARTAN POTASSIUM AND HYDROCHLOROTHIAZIDE 12.5; 5 MG/1; MG/1
1 TABLET ORAL DAILY
Qty: 90 TABLET | Refills: 0 | Status: SHIPPED | OUTPATIENT
Start: 2021-01-26 | End: 2021-02-26

## 2021-02-21 ENCOUNTER — HEALTH MAINTENANCE LETTER (OUTPATIENT)
Age: 51
End: 2021-02-21

## 2021-02-26 ASSESSMENT — ENCOUNTER SYMPTOMS
SORE THROAT: 0
HEMATOCHEZIA: 0
SHORTNESS OF BREATH: 0
CONSTIPATION: 0
FREQUENCY: 0
NAUSEA: 0
DYSURIA: 0
HEADACHES: 0
ARTHRALGIAS: 0
PALPITATIONS: 0
ABDOMINAL PAIN: 0
WEAKNESS: 0
FEVER: 0
JOINT SWELLING: 0
HEARTBURN: 0
NERVOUS/ANXIOUS: 0
PARESTHESIAS: 0
DIZZINESS: 0
BREAST MASS: 0
MYALGIAS: 0
EYE PAIN: 0
DIARRHEA: 0
COUGH: 0
HEMATURIA: 0
CHILLS: 0

## 2021-03-01 ENCOUNTER — OFFICE VISIT (OUTPATIENT)
Dept: OPTOMETRY | Facility: CLINIC | Age: 51
End: 2021-03-01
Payer: COMMERCIAL

## 2021-03-01 DIAGNOSIS — H52.4 PRESBYOPIA: ICD-10-CM

## 2021-03-01 DIAGNOSIS — H52.223 REGULAR ASTIGMATISM OF BOTH EYES: ICD-10-CM

## 2021-03-01 DIAGNOSIS — E11.9 TYPE 2 DIABETES MELLITUS WITHOUT COMPLICATION, WITHOUT LONG-TERM CURRENT USE OF INSULIN (H): Primary | ICD-10-CM

## 2021-03-01 DIAGNOSIS — H52.03 HYPEROPIA, BILATERAL: ICD-10-CM

## 2021-03-01 PROCEDURE — 92015 DETERMINE REFRACTIVE STATE: CPT | Performed by: OPTOMETRIST

## 2021-03-01 PROCEDURE — 92014 COMPRE OPH EXAM EST PT 1/>: CPT | Performed by: OPTOMETRIST

## 2021-03-01 ASSESSMENT — REFRACTION_MANIFEST
OS_ADD: +2.25
OD_CYLINDER: +1.25
METHOD_AUTOREFRACTION: 1
OD_AXIS: 173
OD_CYLINDER: +0.75
OS_AXIS: 173
OS_CYLINDER: +1.25
OS_CYLINDER: +1.25
OS_SPHERE: +1.75
OS_AXIS: 005
OS_SPHERE: +2.00
OD_SPHERE: +1.75
OD_AXIS: 170
OD_ADD: +2.25
OD_SPHERE: +2.25

## 2021-03-01 ASSESSMENT — KERATOMETRY
OD_K1POWER_DIOPTERS: 43.25
OD_AXISANGLE2_DEGREES: 154
OS_K1POWER_DIOPTERS: 42.75
OS_AXISANGLE2_DEGREES: 8
OD_K2POWER_DIOPTERS: 42.25
OS_K2POWER_DIOPTERS: 42.00

## 2021-03-01 ASSESSMENT — VISUAL ACUITY
CORRECTION_TYPE: GLASSES
OS_CC: 20/30
METHOD: SNELLEN - LINEAR
OD_CC: 20/25-1
OD_CC: 20/20
OS_CC: 20/25
OD_CC+: -1

## 2021-03-01 ASSESSMENT — REFRACTION_WEARINGRX
OD_SPHERE: +1.75
OS_ADD: +2.00
OS_AXIS: 015
SPECS_TYPE: PAL
OS_SPHERE: +1.50
OD_CYLINDER: +0.75
OD_ADD: +2.00
OS_CYLINDER: +1.00
OD_AXIS: 170

## 2021-03-01 ASSESSMENT — TONOMETRY
OS_IOP_MMHG: 12
IOP_METHOD: APPLANATION
OD_IOP_MMHG: 12

## 2021-03-01 ASSESSMENT — EXTERNAL EXAM - RIGHT EYE: OD_EXAM: NORMAL

## 2021-03-01 ASSESSMENT — EXTERNAL EXAM - LEFT EYE: OS_EXAM: NORMAL

## 2021-03-01 ASSESSMENT — CUP TO DISC RATIO
OS_RATIO: 0.4
OD_RATIO: 0.4

## 2021-03-01 ASSESSMENT — SLIT LAMP EXAM - LIDS
COMMENTS: NORMAL
COMMENTS: NORMAL

## 2021-03-01 ASSESSMENT — CONF VISUAL FIELD
OD_NORMAL: 1
OS_NORMAL: 1
METHOD: COUNTING FINGERS

## 2021-03-01 NOTE — PATIENT INSTRUCTIONS
Patient was advised of today's exam findings.  Fill glasses prescription  Use over the counter artificial tears bi as needed (  Thera Tears Extra,  Systane Complete )    Keep blood sugar under good control  Return in 1 year for diabetic eye exam      Diabetes weakens the blood vessels all over the body, including the eyes. Damage to the blood vessels in the eyes can cause swelling or bleeding into part of the eye (called the retina). This is called diabetic retinopathy ([facility]-tin--puh-thee). If not treated, this disease can cause vision loss or blindness.   Symptoms may include blurred or distorted vision, but many people have no symptoms. It's important to see your eye doctor regularly to check for problems.   Early treatment and good control can help protect your vision. Here are the things you can do to help prevent vision loss:      1. Keep your blood sugar levels under tight control.      2. Bring high blood pressure under control.      3. No smoking.      4. Have yearly dilated eye exams.      Christiana Chung O.D.  [facility]   [address] [place], [place] [id number] [phone]

## 2021-03-01 NOTE — PROGRESS NOTES
Chief Complaint   Patient presents with     Diabetic Eye Exam    diabetes 2 no use of insulin     Hemoglobin A1C   Date Value Ref Range Status   02/24/2020 6.1 (H) 0 - 5.6 % Final     Comment:     Normal <5.7% Prediabetes 5.7-6.4%  Diabetes 6.5% or higher - adopted from ADA   consensus guidelines.     10/31/2018 6.5 (H) 0 - 5.6 % Final     Comment:     Normal <5.7% Prediabetes 5.7-6.4%  Diabetes 6.5% or higher - adopted from ADA   consensus guidelines.     10/11/2017 7.9 (H) 4.3 - 6.0 % Final         Last Eye Exam: 5/13/2019  Dilated Previously: Yes    What are you currently using to see?  glasses    Distance Vision Acuity: Satisfied with vision, no changes     Near Vision Acuity: Some slight changes, thinks that she is squinting a little bit     Eye Comfort: good, right eye tends dominick more watery  than left   Do you use eye drops? : No  Occupation or Hobbies: Accounting     Juanita Apple Optometric Assistant      Medical, surgical and family histories reviewed and updated 3/1/2021.       OBJECTIVE: See Ophthalmology exam    ASSESSMENT:    ICD-10-CM    1. Type 2 diabetes mellitus without complication, without long-term current use of insulin (H)  E11.9 EYE EXAM (SIMPLE-NONBILLABLE)     REFRACTION   2. Hyperopia, bilateral  H52.03 EYE EXAM (SIMPLE-NONBILLABLE)     REFRACTION   3. Regular astigmatism of both eyes  H52.223 EYE EXAM (SIMPLE-NONBILLABLE)     REFRACTION   4. Presbyopia  H52.4 EYE EXAM (SIMPLE-NONBILLABLE)     REFRACTION      PLAN:    Adela Ernandez aware  eye exam results will be sent to Gena Henriquez.  Patient Instructions   Patient was advised of today's exam findings.  Fill glasses prescription  Use over the counter artificial tears bi as needed (  Thera Tears Extra,  Systane Complete )    Keep blood sugar under good control  Return in 1 year for diabetic eye exam      Diabetes weakens the blood vessels all over the body, including the eyes. Damage to the blood vessels in the eyes can cause  swelling or bleeding into part of the eye (called the retina). This is called diabetic retinopathy ([facility]-tin-AH-puh-thee). If not treated, this disease can cause vision loss or blindness.   Symptoms may include blurred or distorted vision, but many people have no symptoms. It's important to see your eye doctor regularly to check for problems.   Early treatment and good control can help protect your vision. Here are the things you can do to help prevent vision loss:      1. Keep your blood sugar levels under tight control.      2. Bring high blood pressure under control.      3. No smoking.      4. Have yearly dilated eye exams.      Christiana Chung O.D.  [facility]   [address] [place] [address] [phone]

## 2021-03-01 NOTE — LETTER
3/1/2021         RE: Adela Ernandez  [address] [id number]        Dear Colleague,    Thank you for referring your patient, Adela Ernandez, to the [facility].  No diabetic retinopathy was found at eye exam.      Again, thank you for allowing me to participate in the care of your patient.        Sincerely,        Christiana Chung OD

## 2021-03-03 ASSESSMENT — ENCOUNTER SYMPTOMS
DIZZINESS: 0
COUGH: 0
JOINT SWELLING: 0
NAUSEA: 0
SHORTNESS OF BREATH: 0
DYSURIA: 0
CONSTIPATION: 0
NERVOUS/ANXIOUS: 0
WEAKNESS: 0
CHILLS: 0
PALPITATIONS: 0
ARTHRALGIAS: 0
DIARRHEA: 0
FEVER: 0
HEMATURIA: 0
BREAST MASS: 0
EYE PAIN: 0
ABDOMINAL PAIN: 0
HEADACHES: 0
HEARTBURN: 0
HEMATOCHEZIA: 0
SORE THROAT: 0
PARESTHESIAS: 0
FREQUENCY: 0
MYALGIAS: 0

## 2021-03-03 NOTE — PROGRESS NOTES
SUBJECTIVE:   CC: Adela Ernandez is an 50 year old woman who presents for preventive health visit.       Patient has been advised of split billing requirements and indicates understanding: Yes  Healthy Habits:     Getting at least 3 servings of Calcium per day:  Yes    Bi-annual eye exam:  Yes    Dental care twice a year:  Yes    Sleep apnea or symptoms of sleep apnea:  Daytime drowsiness    Diet:  Other    Frequency of exercise:  4-5 days/week    Duration of exercise:  30-45 minutes    Taking medications regularly:  Yes    Medication side effects:  None    PHQ-2 Total Score: 0    Additional concerns today:  No      Pt with diabetes not on meds at this time  Has not been checking BS.  Does not have supplies to do this but cannot afford them at this time due to high deductible  Is on arb, not on statin yet  Is UTD with eye exam    Due for mammogram and is scheduled  Due for colon cancer screening next month. No family history of colon cancer or polyps  Pt prefers cologuard        Today's PHQ-2 Score:   PHQ-2 ( 1999 [employer]) 2/26/2021   Q1: Little interest or pleasure in doing things 0   Q2: Feeling down, depressed or hopeless 0   PHQ-2 Score 0   Q1: Little interest or pleasure in doing things Not at all   Q2: Feeling down, depressed or hopeless Not at all   PHQ-2 Score 0       Abuse: Current or Past (Physical, Sexual or Emotional) - No  Do you feel safe in your environment? Yes    Have you ever done Advance Care Planning? (For example, a Health Directive, POLST, or a discussion with a medical provider or your loved ones about your wishes): No, advance care planning information given to patient to review.  Patient declined advance care planning discussion at this time.    Social History     Tobacco Use     Smoking status: Never Smoker     Smokeless tobacco: Never Used     Tobacco comment: lives in a smoke free household.   Substance Use Topics     Alcohol use: Yes     Alcohol/week: 0.0 standard drinks     Comment:  rare         Alcohol Use 2/26/2021   Prescreen: >3 drinks/day or >7 drinks/week? No   Prescreen: >3 drinks/day or >7 drinks/week? -       Any new diagnosis of family breast, ovarian, or bowel cancer? No     Reviewed orders with patient.  Reviewed health maintenance and updated orders accordingly - Yes  Lab work is in process    Breast CA Risk Screening:  No flowsheet data found.      Mammogram Screening: Recommended annual mammography  Pertinent mammograms are reviewed under the imaging tab.    History of abnormal Pap smear: NO - age 30-65 PAP every 5 years with negative HPV co-testing recommended  PAP / HPV Latest Ref Rng & Units 10/11/2017 1/23/2015 8/10/2012   PAP - NIL Negative NIL   HPV 16 DNA NEG:Negative Negative - -   HPV 18 DNA NEG:Negative Negative - -   OTHER HR HPV NEG:Negative Negative - -     Reviewed and updated as needed this visit by clinical staff  Tobacco  Allergies  Meds   Med Hx  Surg Hx  Fam Hx  Soc Hx        Reviewed and updated as needed this visit by Provider                    Review of Systems   Constitutional: Negative for chills and fever.   HENT: Negative for congestion, ear pain, hearing loss and sore throat.    Eyes: Negative for pain and visual disturbance.   Respiratory: Negative for cough and shortness of breath.    Cardiovascular: Negative for chest pain, palpitations and peripheral edema.   Gastrointestinal: Negative for abdominal pain, constipation, diarrhea, heartburn, hematochezia and nausea.   Breasts:  Negative for tenderness, breast mass and discharge.   Genitourinary: Negative for dysuria, frequency, genital sores, hematuria, pelvic pain, urgency, vaginal bleeding and vaginal discharge.   Musculoskeletal: Negative for arthralgias, joint swelling and myalgias.   Skin: Negative for rash.   Neurological: Negative for dizziness, weakness, headaches and paresthesias.   Psychiatric/Behavioral: Negative for mood changes. The patient is not nervous/anxious.   "    CONSTITUTIONAL: NEGATIVE for fever, chills, change in weight  INTEGUMENTARU/SKIN: NEGATIVE for worrisome rashes, moles or lesions  EYES: NEGATIVE for vision changes or irritation  ENT: NEGATIVE for ear, mouth and throat problems  RESP: NEGATIVE for significant cough or SOB  BREAST: NEGATIVE for masses, tenderness or discharge  CV: NEGATIVE for chest pain, palpitations or peripheral edema  GI: NEGATIVE for nausea, abdominal pain, heartburn, or change in bowel habits  : NEGATIVE for unusual urinary or vaginal symptoms. Periods are regular.  MUSCULOSKELETAL: NEGATIVE for significant arthralgias or myalgia  NEURO: NEGATIVE for weakness, dizziness or paresthesias  PSYCHIATRIC: NEGATIVE for changes in mood or affect     OBJECTIVE:   /52   Pulse 84   Temp 97.7  F (36.5  C) (Oral)   Ht 1.549 m (5' 1\")   Wt 95.7 kg (211 lb)   BMI 39.87 kg/m    Physical Exam  GENERAL: healthy, alert and no distress  EYES: Eyes grossly normal to inspection, PERRL and conjunctivae and sclerae normal  HENT: ear canals and TM's normal, nose and mouth without ulcers or lesions  NECK: no adenopathy, no asymmetry, masses, or scars and thyroid normal to palpation  RESP: lungs clear to auscultation - no rales, rhonchi or wheezes  BREAST: normal without masses, tenderness or nipple discharge and no palpable axillary masses or adenopathy  CV: regular rate and rhythm, normal S1 S2, no S3 or S4, no murmur, click or rub, no peripheral edema and peripheral pulses strong  ABDOMEN: soft, nontender, no hepatosplenomegaly, no masses and bowel sounds normal  MS: no gross musculoskeletal defects noted, no edema  SKIN: no suspicious lesions or rashes  NEURO: Normal strength and tone, mentation intact and speech normal  PSYCH: mentation appears normal, affect normal/bright    Diagnostic Test Results:  Labs reviewed in Epic    ASSESSMENT/PLAN:   1. Routine history and physical examination of adult  completed    2. Type 2 diabetes mellitus without " "complication, without long-term current use of insulin (H)  Due for updated labs, may need to start meds  - Hemoglobin A1c  - Albumin Random Urine Quantitative with Creat Ratio  - Basic metabolic panel  - FOOT EXAM    3. Morbid obesity (H)  Encouraged regular exercise and healthy diet    4. Essential hypertension  At goal on meds  - losartan-hydrochlorothiazide (HYZAAR) 50-12.5 MG tablet; Take 1 tablet by mouth daily  Dispense: 90 tablet; Refill: 1  - Albumin Random Urine Quantitative with Creat Ratio  - Basic metabolic panel    5. High triglycerides  Not on statin yet  - Lipid panel reflex to direct LDL Fasting    6. Encounter for screening mammogram for breast cancer  scheduled  - *MA Screening Digital Bilateral; Future    7. Screen for colon cancer    - [employer])    Patient has been advised of split billing requirements and indicates understanding: Yes  COUNSELING:  Reviewed preventive health counseling, as reflected in patient instructions       Regular exercise       Healthy diet/nutrition       Vision screening       ([place])menopause management    Estimated body mass index is 39.87 kg/m  as calculated from the following:    Height as of this encounter: 1.549 m (5' 1\").    Weight as of this encounter: 95.7 kg (211 lb).    Weight management plan: Discussed healthy diet and exercise guidelines    She reports that she has never smoked. She has never used smokeless tobacco.      Counseling Resources:  ATP IV Guidelines  Pooled Cohorts Equation Calculator  Breast Cancer Risk Calculator  BRCA-Related Cancer Risk Assessment: FHS-7 Tool  FRAX Risk Assessment  ICSI Preventive Guidelines  Dietary Guidelines for Americans, 2010  USDA's MyPlate  ASA Prophylaxis  Lung CA Screening    Gena Henriquez MD  [facility]  "

## 2021-03-04 ENCOUNTER — OFFICE VISIT (OUTPATIENT)
Dept: FAMILY MEDICINE | Facility: CLINIC | Age: 51
End: 2021-03-04
Payer: COMMERCIAL

## 2021-03-04 VITALS
TEMPERATURE: 97.7 F | WEIGHT: 211 LBS | SYSTOLIC BLOOD PRESSURE: 116 MMHG | DIASTOLIC BLOOD PRESSURE: 52 MMHG | HEART RATE: 84 BPM | BODY MASS INDEX: 39.84 KG/M2 | HEIGHT: 61 IN

## 2021-03-04 DIAGNOSIS — E66.01 MORBID OBESITY (H): ICD-10-CM

## 2021-03-04 DIAGNOSIS — E78.1 HIGH TRIGLYCERIDES: ICD-10-CM

## 2021-03-04 DIAGNOSIS — E11.9 TYPE 2 DIABETES MELLITUS WITHOUT COMPLICATION, WITHOUT LONG-TERM CURRENT USE OF INSULIN (H): ICD-10-CM

## 2021-03-04 DIAGNOSIS — I10 ESSENTIAL HYPERTENSION: ICD-10-CM

## 2021-03-04 DIAGNOSIS — Z12.11 SCREEN FOR COLON CANCER: ICD-10-CM

## 2021-03-04 DIAGNOSIS — Z00.00 ROUTINE HISTORY AND PHYSICAL EXAMINATION OF ADULT: Primary | ICD-10-CM

## 2021-03-04 DIAGNOSIS — Z12.31 ENCOUNTER FOR SCREENING MAMMOGRAM FOR BREAST CANCER: ICD-10-CM

## 2021-03-04 LAB
ANION GAP SERPL CALCULATED.3IONS-SCNC: 3 MMOL/L (ref 3–14)
BUN SERPL-MCNC: 10 MG/DL (ref 7–30)
CALCIUM SERPL-MCNC: 9.1 MG/DL (ref 8.5–10.1)
CHLORIDE SERPL-SCNC: 103 MMOL/L (ref 94–109)
CHOLEST SERPL-MCNC: 221 MG/DL
CO2 SERPL-SCNC: 30 MMOL/L (ref 20–32)
CREAT SERPL-MCNC: 0.65 MG/DL (ref 0.52–1.04)
CREAT UR-MCNC: 8 MG/DL
GFR SERPL CREATININE-BSD FRML MDRD: >90 ML/MIN/{1.73_M2}
GLUCOSE SERPL-MCNC: 134 MG/DL (ref 70–99)
HBA1C MFR BLD: 6.4 % (ref 0–5.6)
HDLC SERPL-MCNC: 56 MG/DL
LDLC SERPL CALC-MCNC: 144 MG/DL
MICROALBUMIN UR-MCNC: <5 MG/L
MICROALBUMIN/CREAT UR: NORMAL MG/G CR (ref 0–25)
NONHDLC SERPL-MCNC: 165 MG/DL
POTASSIUM SERPL-SCNC: 3.9 MMOL/L (ref 3.4–5.3)
SODIUM SERPL-SCNC: 136 MMOL/L (ref 133–144)
TRIGL SERPL-MCNC: 107 MG/DL

## 2021-03-04 PROCEDURE — 83036 HEMOGLOBIN GLYCOSYLATED A1C: CPT | Performed by: FAMILY MEDICINE

## 2021-03-04 PROCEDURE — 80048 BASIC METABOLIC PNL TOTAL CA: CPT | Performed by: FAMILY MEDICINE

## 2021-03-04 PROCEDURE — 82043 UR ALBUMIN QUANTITATIVE: CPT | Performed by: FAMILY MEDICINE

## 2021-03-04 PROCEDURE — 99214 OFFICE O/P EST MOD 30 MIN: CPT | Mod: 25 | Performed by: FAMILY MEDICINE

## 2021-03-04 PROCEDURE — 99396 PREV VISIT EST AGE 40-64: CPT | Performed by: FAMILY MEDICINE

## 2021-03-04 PROCEDURE — 80061 LIPID PANEL: CPT | Performed by: FAMILY MEDICINE

## 2021-03-04 PROCEDURE — 36415 COLL VENOUS BLD VENIPUNCTURE: CPT | Performed by: FAMILY MEDICINE

## 2021-03-04 RX ORDER — LOSARTAN POTASSIUM AND HYDROCHLOROTHIAZIDE 12.5; 5 MG/1; MG/1
1 TABLET ORAL DAILY
Qty: 90 TABLET | Refills: 1 | Status: SHIPPED | OUTPATIENT
Start: 2021-03-04 | End: 2021-10-10

## 2021-03-04 ASSESSMENT — MIFFLIN-ST. JEOR: SCORE: 1519.47

## 2021-03-11 ENCOUNTER — ANCILLARY PROCEDURE (OUTPATIENT)
Dept: MAMMOGRAPHY | Facility: CLINIC | Age: 51
End: 2021-03-11
Payer: COMMERCIAL

## 2021-03-11 DIAGNOSIS — Z12.31 ENCOUNTER FOR SCREENING MAMMOGRAM FOR BREAST CANCER: ICD-10-CM

## 2021-03-11 PROCEDURE — 77063 BREAST TOMOSYNTHESIS BI: CPT | Mod: TC | Performed by: RADIOLOGY

## 2021-03-11 PROCEDURE — 77067 SCR MAMMO BI INCL CAD: CPT | Mod: TC | Performed by: RADIOLOGY

## 2021-09-26 ENCOUNTER — HEALTH MAINTENANCE LETTER (OUTPATIENT)
Age: 51
End: 2021-09-26

## 2021-10-09 DIAGNOSIS — I10 ESSENTIAL HYPERTENSION: ICD-10-CM

## 2021-10-10 RX ORDER — LOSARTAN POTASSIUM AND HYDROCHLOROTHIAZIDE 12.5; 5 MG/1; MG/1
1 TABLET ORAL DAILY
Qty: 30 TABLET | Refills: 0 | Status: SHIPPED | OUTPATIENT
Start: 2021-10-10 | End: 2021-11-09

## 2021-11-08 DIAGNOSIS — I10 ESSENTIAL HYPERTENSION: ICD-10-CM

## 2021-11-09 RX ORDER — LOSARTAN POTASSIUM AND HYDROCHLOROTHIAZIDE 12.5; 5 MG/1; MG/1
1 TABLET ORAL DAILY
Qty: 30 TABLET | Refills: 0 | Status: SHIPPED | OUTPATIENT
Start: 2021-11-09 | End: 2021-11-16

## 2021-11-16 DIAGNOSIS — I10 ESSENTIAL HYPERTENSION: ICD-10-CM

## 2021-11-16 RX ORDER — LOSARTAN POTASSIUM AND HYDROCHLOROTHIAZIDE 12.5; 5 MG/1; MG/1
1 TABLET ORAL DAILY
Qty: 30 TABLET | Refills: 0 | Status: SHIPPED | OUTPATIENT
Start: 2021-11-16 | End: 2022-01-26

## 2022-01-26 DIAGNOSIS — I10 ESSENTIAL HYPERTENSION: ICD-10-CM

## 2022-01-26 RX ORDER — LOSARTAN POTASSIUM AND HYDROCHLOROTHIAZIDE 12.5; 5 MG/1; MG/1
TABLET ORAL
Qty: 90 TABLET | Refills: 0 | Status: SHIPPED | OUTPATIENT
Start: 2022-01-26 | End: 2022-03-06

## 2022-01-26 NOTE — TELEPHONE ENCOUNTER
"Next 5 appointments (look out 90 days)      Mar 10, 2022  7:00 AM  (Arrive by 6:45 AM)  Adult Preventative Visit with Gena Henriquez MD  [facility] ([facility] ) [address] [facility] [address] [phone]          Requested Prescriptions   Pending Prescriptions Disp Refills    losartan-hydrochlorothiazide (HYZAAR) 50-12.5 MG tablet [Pharmacy Med Name: LOSARTAN/HCTZ 50/12.5MG TABLETS] 30 tablet 0     Sig: TAKE 1 TABLET BY MOUTH DAILY        Angiotensin-II Receptors Passed - 1/26/2022  3:12 AM        Passed - Last blood pressure under 140/90 in past 12 months       BP Readings from Last 3 Encounters:   03/04/21 116/52   02/24/20 126/73   12/05/19 121/76                 Passed - Recent (12 mo) or future (30 days) visit within the authorizing provider's specialty     Patient has had an office visit with the authorizing provider or a provider within the authorizing providers department within the previous 12 mos or has a future within next 30 days. See \"Patient Info\" tab in inbasket, or \"Choose Columns\" in Meds & Orders section of the refill encounter.              Passed - Medication is active on med list        Passed - Patient is age 18 or older        Passed - No active pregnancy on record        Passed - Normal serum creatinine on file in past 12 months     Recent Labs   Lab Test 03/04/21  0728   CR 0.65       Ok to refill medication if creatinine is low          Passed - Normal serum potassium on file in past 12 months       Recent Labs   Lab Test 03/04/21  0728   POTASSIUM 3.9                    Passed - No positive pregnancy test in past 12 months       Diuretics (Including Combos) Protocol Passed - 1/26/2022  3:12 AM        Passed - Blood pressure under 140/90 in past 12 months       BP Readings from Last 3 Encounters:   03/04/21 116/52   02/24/20 126/73   12/05/19 121/76                 Passed - Recent (12 mo) or future (30 days) visit within " "the authorizing provider's specialty     Patient has had an office visit with the authorizing provider or a provider within the authorizing providers department within the previous 12 mos or has a future within next 30 days. See \"Patient Info\" tab in inbasket, or \"Choose Columns\" in Meds & Orders section of the refill encounter.              Passed - Medication is active on med list        Passed - Patient is age 18 or older        Passed - No active pregancy on record        Passed - Normal serum creatinine on file in past 12 months       Recent Labs   Lab Test 03/04/21  0728   CR 0.65              Passed - Normal serum potassium on file in past 12 months       Recent Labs   Lab Test 03/04/21  0728   POTASSIUM 3.9                    Passed - Normal serum sodium on file in past 12 months       Recent Labs   Lab Test 03/04/21  0728                 Passed - No positive pregnancy test in past 12 months              "

## 2022-03-07 ENCOUNTER — OFFICE VISIT (OUTPATIENT)
Dept: OPTOMETRY | Facility: CLINIC | Age: 52
End: 2022-03-07
Payer: COMMERCIAL

## 2022-03-07 DIAGNOSIS — H52.03 HYPEROPIA, BILATERAL: ICD-10-CM

## 2022-03-07 DIAGNOSIS — E11.9 TYPE 2 DIABETES MELLITUS WITHOUT COMPLICATION, WITHOUT LONG-TERM CURRENT USE OF INSULIN (H): Primary | ICD-10-CM

## 2022-03-07 DIAGNOSIS — H52.223 REGULAR ASTIGMATISM OF BOTH EYES: ICD-10-CM

## 2022-03-07 DIAGNOSIS — H52.4 PRESBYOPIA: ICD-10-CM

## 2022-03-07 PROCEDURE — 92014 COMPRE OPH EXAM EST PT 1/>: CPT | Performed by: OPTOMETRIST

## 2022-03-07 PROCEDURE — 92015 DETERMINE REFRACTIVE STATE: CPT | Performed by: OPTOMETRIST

## 2022-03-07 ASSESSMENT — REFRACTION_MANIFEST
OD_ADD: +2.25
OS_CYLINDER: +1.00
OD_AXIS: 169
OS_AXIS: 015
OD_SPHERE: +2.50
OS_SPHERE: +2.00
OD_CYLINDER: +1.25
OD_SPHERE: +2.50
OD_AXIS: 165
OS_AXIS: 174
OS_ADD: +2.25
OS_SPHERE: +2.25
METHOD_AUTOREFRACTION: 1
OS_CYLINDER: +1.50
OD_CYLINDER: +1.00

## 2022-03-07 ASSESSMENT — KERATOMETRY
OD_K2POWER_DIOPTERS: 42.25
OS_K1POWER_DIOPTERS: 43.00
OD_K1POWER_DIOPTERS: 43.25
OS_AXISANGLE2_DEGREES: 8
OD_AXISANGLE2_DEGREES: 151
OS_K2POWER_DIOPTERS: 42.25

## 2022-03-07 ASSESSMENT — REFRACTION_WEARINGRX
OD_AXIS: 170
OS_SPHERE: +1.75
SPECS_TYPE: PAL
OD_CYLINDER: +0.75
OS_AXIS: 005
OS_CYLINDER: +1.25
OD_ADD: +2.25
OS_ADD: +2.25
OD_SPHERE: +1.75

## 2022-03-07 ASSESSMENT — VISUAL ACUITY
OS_CC+: -1
METHOD: SNELLEN - LINEAR
OD_CC+: -1
OS_CC: 20/25
OD_CC: 20/40+2
OS_CC: 20/25
CORRECTION_TYPE: GLASSES
OD_CC: 20/25

## 2022-03-07 ASSESSMENT — ENCOUNTER SYMPTOMS
HEMATOCHEZIA: 0
CHILLS: 0
PALPITATIONS: 0
ABDOMINAL PAIN: 0
SHORTNESS OF BREATH: 0
MYALGIAS: 0
JOINT SWELLING: 0
ARTHRALGIAS: 0
NAUSEA: 0
DYSURIA: 0
FEVER: 0
PARESTHESIAS: 0
SORE THROAT: 0
DIARRHEA: 0
CONSTIPATION: 0
COUGH: 0
NERVOUS/ANXIOUS: 0
EYE PAIN: 0
FREQUENCY: 0
HEMATURIA: 0
HEARTBURN: 0
WEAKNESS: 0
BREAST MASS: 1
DIZZINESS: 0
HEADACHES: 0

## 2022-03-07 ASSESSMENT — SLIT LAMP EXAM - LIDS
COMMENTS: NORMAL
COMMENTS: NORMAL

## 2022-03-07 ASSESSMENT — CONF VISUAL FIELD
METHOD: COUNTING FINGERS
OS_NORMAL: 1
OD_NORMAL: 1

## 2022-03-07 ASSESSMENT — CUP TO DISC RATIO
OD_RATIO: 0.4
OS_RATIO: 0.4

## 2022-03-07 ASSESSMENT — EXTERNAL EXAM - LEFT EYE: OS_EXAM: NORMAL

## 2022-03-07 ASSESSMENT — TONOMETRY
OD_IOP_MMHG: 21
IOP_METHOD: APPLANATION
OS_IOP_MMHG: 21

## 2022-03-07 ASSESSMENT — EXTERNAL EXAM - RIGHT EYE: OD_EXAM: NORMAL

## 2022-03-07 NOTE — PROGRESS NOTES
Chief Complaint   Patient presents with     Diabetic Eye Exam      Patient doesn't take any diabetes medication, controlled by diet.    Chief Complaint(s) and History of Present Illness(es)     Diabetic Eye Exam     Vision: is stable    Diabetes Type: Type 2 and controlled with diet    Blood Sugars: is controlled               Hemoglobin A1C POCT   Date Value Ref Range Status   03/04/2021 6.4 (H) 0 - 5.6 % Final     Comment:     Normal <5.7% Prediabetes 5.7-6.4%  Diabetes 6.5% or higher - adopted from ADA   consensus guidelines.     02/24/2020 6.1 (H) 0 - 5.6 % Final     Comment:     Normal <5.7% Prediabetes 5.7-6.4%  Diabetes 6.5% or higher - adopted from ADA   consensus guidelines.     10/31/2018 6.5 (H) 0 - 5.6 % Final     Comment:     Normal <5.7% Prediabetes 5.7-6.4%  Diabetes 6.5% or higher - adopted from ADA   consensus guidelines.              Last Eye Exam: 3/1/21  Dilated Previously: Yes    What are you currently using to see?  Glasses, wears them all of the time     Distance Vision Acuity: Satisfied with vision, has not noticed any changes     Near Vision Acuity: Satisfied with vision while reading and using computer with glasses    Eye Comfort: good and watery at times, usually the right eye  Do you use eye drops? : Yes: Using Systane, she thinks. Doesn't use the drops often  Occupation or Hobbies: [employer]     Juanita Apple Optometric Assistant      Medical, surgical and family histories reviewed and updated 3/7/2022.       OBJECTIVE: See Ophthalmology exam    ASSESSMENT:    ICD-10-CM    1. Type 2 diabetes mellitus without complication, without long-term current use of insulin (H)  E11.9 EYE EXAM (SIMPLE-NONBILLABLE)     REFRACTION   2. Hyperopia, bilateral  H52.03 EYE EXAM (SIMPLE-NONBILLABLE)     REFRACTION   3. Regular astigmatism of both eyes  H52.223 EYE EXAM (SIMPLE-NONBILLABLE)     REFRACTION   4. Presbyopia  H52.4 EYE EXAM (SIMPLE-NONBILLABLE)     REFRACTION      PLAN:    Adela Ernandez aware  eye  exam results will be sent to Gena Henriquez.  Patient Instructions   Fill glasses prescription  Return in 1 year for eye exam      Blood sugar and blood pressure control are very important in the prevention of ocular complications from diabetes.       Christiana Chung, OD  [phone]

## 2022-03-07 NOTE — LETTER
3/7/2022         RE: Adela Ernandez  [address] [facility] [address]        Dear Colleague,    Thank you for referring your patient, Adela Ernandez, to the [facility].No diabetic retinopathy was found at eye exam.  Please see a copy of my visit note below.    Chief Complaint   Patient presents with     Diabetic Eye Exam      Patient doesn't take any diabetes medication, controlled by diet.    Chief Complaint(s) and History of Present Illness(es)     Diabetic Eye Exam     Vision: is stable    Diabetes Type: Type 2 and controlled with diet    Blood Sugars: is controlled               Hemoglobin A1C POCT   Date Value Ref Range Status   03/04/2021 6.4 (H) 0 - 5.6 % Final     Comment:     Normal <5.7% Prediabetes 5.7-6.4%  Diabetes 6.5% or higher - adopted from ADA   consensus guidelines.     02/24/2020 6.1 (H) 0 - 5.6 % Final     Comment:     Normal <5.7% Prediabetes 5.7-6.4%  Diabetes 6.5% or higher - adopted from ADA   consensus guidelines.     10/31/2018 6.5 (H) 0 - 5.6 % Final     Comment:     Normal <5.7% Prediabetes 5.7-6.4%  Diabetes 6.5% or higher - adopted from ADA   consensus guidelines.              Last Eye Exam: 3/1/21  Dilated Previously: Yes    What are you currently using to see?  Glasses, wears them all of the time     Distance Vision Acuity: Satisfied with vision, has not noticed any changes     Near Vision Acuity: Satisfied with vision while reading and using computer with glasses    Eye Comfort: good and watery at times, usually the right eye  Do you use eye drops? : Yes: Using Systane, she thinks. Doesn't use the drops often  Occupation or Hobbies: Accounting     Juanita Apple Optometric Assistant      Medical, surgical and family histories reviewed and updated 3/7/2022.       OBJECTIVE: See Ophthalmology exam    ASSESSMENT:    ICD-10-CM    1. Type 2 diabetes mellitus without complication, without long-term current use of insulin (H)  E11.9 EYE EXAM  (SIMPLE-NONBILLABLE)     REFRACTION   2. Hyperopia, bilateral  H52.03 EYE EXAM (SIMPLE-NONBILLABLE)     REFRACTION   3. Regular astigmatism of both eyes  H52.223 EYE EXAM (SIMPLE-NONBILLABLE)     REFRACTION   4. Presbyopia  H52.4 EYE EXAM (SIMPLE-NONBILLABLE)     REFRACTION      PLAN:    Adela Ernandez aware  eye exam results will be sent to Gena Henriquez.  Patient Instructions   Fill glasses prescription  Return in 1 year for eye exam      Blood sugar and blood pressure control are very important in the prevention of ocular complications from diabetes.       Christiana Chung, OD  [phone]                         Again, thank you for allowing me to participate in the care of your patient.        Sincerely,        Christiana Chung, OD

## 2022-03-08 ASSESSMENT — ENCOUNTER SYMPTOMS
FREQUENCY: 0
CHILLS: 0
PALPITATIONS: 0
MYALGIAS: 0
EYE PAIN: 0
COUGH: 0
SORE THROAT: 0
ARTHRALGIAS: 0
CONSTIPATION: 0
HEMATOCHEZIA: 0
WEAKNESS: 0
HEADACHES: 0
SHORTNESS OF BREATH: 0
ABDOMINAL PAIN: 0
PARESTHESIAS: 0
DYSURIA: 0
FEVER: 0
HEMATURIA: 0
NAUSEA: 0
JOINT SWELLING: 0
DIZZINESS: 0
HEARTBURN: 0
DIARRHEA: 0
NERVOUS/ANXIOUS: 0
BREAST MASS: 1

## 2022-03-08 NOTE — PROGRESS NOTES
SUBJECTIVE:   CC: Adela Ernandez is an 51 year old woman who presents for preventive health visit.       Patient has been advised of split billing requirements and indicates understanding: Yes  Healthy Habits:     Getting at least 3 servings of Calcium per day:  Yes    Bi-annual eye exam:  Yes    Dental care twice a year:  NO    Sleep apnea or symptoms of sleep apnea:  Daytime drowsiness and Excessive snoring    Diet:  Regular (no restrictions)    Frequency of exercise:  None    Taking medications regularly:  Yes    Medication side effects:  Not applicable    PHQ-2 Total Score: 0      Today's PHQ-2 Score:   PHQ-2 ( 1999 [employer]) 3/7/2022   Q1: Little interest or pleasure in doing things 0   Q2: Feeling down, depressed or hopeless 0   PHQ-2 Score 0   PHQ-2 Total Score (12-17 Years)- Positive if 3 or more points; Administer PHQ-A if positive -   Q1: Little interest or pleasure in doing things Not at all   Q2: Feeling down, depressed or hopeless Not at all   PHQ-2 Score 0       Abuse: Current or Past (Physical, Sexual or Emotional) - No  Do you feel safe in your environment? Yes       Social History     Tobacco Use     Smoking status: Never Smoker     Smokeless tobacco: Never Used     Tobacco comment: lives in a smoke free household.   Substance Use Topics     Alcohol use: Yes     Alcohol/week: 0.0 standard drinks     Comment: rare     If you drink alcohol do you typically have >3 drinks per day or >7 drinks per week? No    Alcohol Use 3/10/2022   Prescreen: >3 drinks/day or >7 drinks/week? -   Prescreen: >3 drinks/day or >7 drinks/week? No       Reviewed orders with patient.  Reviewed health maintenance and updated orders accordingly - Yes  Lab work is in process  BP Readings from Last 3 Encounters:   03/10/22 124/64   03/04/21 116/52   02/24/20 126/73    Wt Readings from Last 3 Encounters:   03/10/22 101.6 kg (224 lb)   03/04/21 95.7 kg (211 lb)   02/24/20 93.4 kg (206 lb)                  Recent Labs   Lab Test  03/04/21  0728 02/24/20  1628 10/31/18  1714 10/11/17  1806 10/27/16  1825   A1C 6.4* 6.1* 6.5*   < >  --    * 76  --   --   --    HDL 56 35*  --   --   --    TRIG 107 204*  --   --   --    ALT  --   --  30  --  331*   CR 0.65 0.70 0.65   < > 0.56   GFRESTIMATED >90 >90 >90   < > >90  Non  GFR Calc     GFRESTBLACK >90 >90 >90   < > >90  African American GFR Calc     POTASSIUM 3.9 4.5 4.0   < > 3.8   TSH  --   --  0.68  --   --     < > = values in this interval not displayed.        Breast Cancer Screening:    Breast CA Risk Assessment (FHS-7) 3/7/2022   Do you have a family history of breast, colon, or ovarian cancer? No / Unknown       Mammogram Screening: Recommended annual mammography  Pertinent mammograms are reviewed under the imaging tab.    History of abnormal Pap smear: NO - age 30-65 PAP every 5 years with negative HPV co-testing recommended  PAP / HPV Latest Ref Rng & Units 10/11/2017 1/23/2015 8/10/2012   PAP (Historical) - NIL Negative NIL   HPV16 NEG:Negative Negative - -   HPV18 NEG:Negative Negative - -   HRHPV NEG:Negative Negative - -     Reviewed and updated as needed this visit by clinical staff   Tobacco  Allergies  Meds   Med Hx  Surg Hx  Fam Hx  Soc Hx        Reviewed and updated as needed this visit by Provider                   Review of Systems   Constitutional: Negative for chills and fever.   HENT: Negative for congestion, ear pain, hearing loss and sore throat.    Eyes: Negative for pain and visual disturbance.   Respiratory: Negative for cough and shortness of breath.    Cardiovascular: Negative for chest pain, palpitations and peripheral edema.   Gastrointestinal: Negative for abdominal pain, constipation, diarrhea, heartburn, hematochezia and nausea.   Breasts:  Positive for breast mass. Negative for tenderness and discharge.   Genitourinary: Negative for dysuria, frequency, genital sores, hematuria, pelvic pain, urgency, vaginal bleeding and vaginal  "discharge.   Musculoskeletal: Negative for arthralgias, joint swelling and myalgias.   Skin: Negative for rash.   Neurological: Negative for dizziness, weakness, headaches and paresthesias.   Psychiatric/Behavioral: Negative for mood changes. The patient is not nervous/anxious.         OBJECTIVE:   /64   Pulse 81   Temp 97.8  F (36.6  C) (Oral)   Resp 16   Ht 1.549 m (5' 1\")   Wt 101.6 kg (224 lb)   LMP 12/24/2021   BMI 42.32 kg/m    Physical Exam  GENERAL APPEARANCE: healthy, alert and no distress  EYES: Eyes grossly normal to inspection, PERRL and conjunctivae and sclerae normal  HENT: ear canals and TM's normal, nose and mouth without ulcers or lesions, oropharynx clear and oral mucous membranes moist  NECK: no adenopathy, no asymmetry, masses, or scars and thyroid normal to palpation  RESP: lungs clear to auscultation - no rales, rhonchi or wheezes  BREAST: 1 cm right palpable axillary mass, hard, non-tender, fixed.   CV: regular rate and rhythm, normal S1 S2, no S3 or S4, no murmur, click or rub, no peripheral edema and peripheral pulses strong  ABDOMEN: soft, nontender, no hepatosplenomegaly, no masses and bowel sounds normal   (female): normal female external genitalia, normal urethral meatus, vaginal mucosal atrophy noted, normal cervix, adnexae, and uterus without masses or abnormal discharge  MS: no musculoskeletal defects are noted and gait is age appropriate without ataxia  SKIN: no suspicious lesions or rashes  NEURO: Normal strength and tone, sensory exam grossly normal, mentation intact and speech normal  PSYCH: mentation appears normal and affect normal/bright    ASSESSMENT/PLAN:   (Z00.00) Routine general medical examination at a health care facility  (primary encounter diagnosis)  Comment: Preventative needs reviewed.    Plan: See orders in Epic.              (E11.9) Type 2 diabetes, HbA1c goal < 7% (H)  Comment: Ongoing. Patient does not check BG at home. Recommended she f/u every 6 " months, however, her insurance only covers once a year.   Plan: **A1C FUTURE 3mo, Albumin Random Urine         Quantitative with Creat Ratio, **Basic         metabolic panel FUTURE 2mo, Adult Eye Referral,        FOOT EXAM, aspirin (ASA) 81 MG chewable tablet          (E66.01) Morbid obesity (H)  (Z68.41) BMI 40.0-44.9, adult (H)  Comment: Ongoing  Plan: Continue with working on increasing daily exercise and improving diet    (I10) Essential hypertension  Comment: Stable on med  Plan: losartan-hydrochlorothiazide (HYZAAR) 50-12.5         MG tablet        Refilled x 12 months    (E78.1) High triglycerides  Comment: Ongoing. Starting statin today.  Plan: Lipid panel reflex to direct LDL Fasting,         simvastatin (ZOCOR) 20 MG tablet       Filled x 12 months. Patient to notify clinic if she experiences muscle aches or any other side effects.    (N63.10) Breast mass, right  (Z12.31) Encounter for screening mammogram for breast cancer  Comment: New, see physical exam  Plan: MA Diagnostic Digital Bilateral    (N93.9) Vaginal bleeding  Comment: Patient previously on depo-provera. When she discontinued in 2020, she did not have a period for over a year. In august, she had covid and some vaginal spotting and in December she reports having a normal period. No bleeding since December.   Plan: Discussed that she may not be considered post-menopausal until she is period-free for 1 year. Her Depo may have contributed to her not having a period return upon stopping. Discussed option for checking lab work to assess menopause status vs pelvic ultrasound Patient declined at this time.     (Z12.11) Screen for colon cancer  Comment: Due  Plan: COLOGUARD([employer])         Notify clinic if kit does not come to your house    (Z12.4) Screening for malignant neoplasm of cervix  Comment: Due  Plan: Pap screen with HPV - recommended age 30 - 65         years       Patient has been advised of split billing requirements and indicates  "understanding: Yes    COUNSELING:  Reviewed preventive health counseling, as reflected in patient instructions  Special attention given to:        Regular exercise       Healthy diet/nutrition       Vision screening    Estimated body mass index is 42.32 kg/m  as calculated from the following:    Height as of this encounter: 1.549 m (5' 1\").    Weight as of this encounter: 101.6 kg (224 lb).      She reports that she has never smoked. She has never used smokeless tobacco.      Counseling Resources:  ATP IV Guidelines  Pooled Cohorts Equation Calculator  Breast Cancer Risk Calculator  BRCA-Related Cancer Risk Assessment: FHS-7 Tool  FRAX Risk Assessment  ICSI Preventive Guidelines  Dietary Guidelines for Americans, 2010  USDA's MyPlate  ASA Prophylaxis  Lung CA Screening    Gena Henriquez MD  [facility]    "

## 2022-03-08 NOTE — PATIENT INSTRUCTIONS

## 2022-03-09 NOTE — PATIENT INSTRUCTIONS
Fill glasses prescription  Return in 1 year for eye exam      Blood sugar and blood pressure control are very important in the prevention of ocular complications from diabetes.       Christiana Chung, OD  [phone]

## 2022-03-10 ENCOUNTER — OFFICE VISIT (OUTPATIENT)
Dept: FAMILY MEDICINE | Facility: CLINIC | Age: 52
End: 2022-03-10
Payer: COMMERCIAL

## 2022-03-10 VITALS
DIASTOLIC BLOOD PRESSURE: 64 MMHG | HEIGHT: 61 IN | SYSTOLIC BLOOD PRESSURE: 124 MMHG | TEMPERATURE: 97.8 F | RESPIRATION RATE: 16 BRPM | HEART RATE: 81 BPM | WEIGHT: 224 LBS | BODY MASS INDEX: 42.29 KG/M2

## 2022-03-10 DIAGNOSIS — E78.1 HIGH TRIGLYCERIDES: ICD-10-CM

## 2022-03-10 DIAGNOSIS — I10 ESSENTIAL HYPERTENSION: ICD-10-CM

## 2022-03-10 DIAGNOSIS — N93.9 VAGINAL BLEEDING: ICD-10-CM

## 2022-03-10 DIAGNOSIS — N63.10 BREAST MASS, RIGHT: ICD-10-CM

## 2022-03-10 DIAGNOSIS — Z00.00 ROUTINE GENERAL MEDICAL EXAMINATION AT A HEALTH CARE FACILITY: Primary | ICD-10-CM

## 2022-03-10 DIAGNOSIS — E66.01 MORBID OBESITY (H): ICD-10-CM

## 2022-03-10 DIAGNOSIS — Z12.4 SCREENING FOR MALIGNANT NEOPLASM OF CERVIX: ICD-10-CM

## 2022-03-10 DIAGNOSIS — Z12.31 ENCOUNTER FOR SCREENING MAMMOGRAM FOR BREAST CANCER: ICD-10-CM

## 2022-03-10 DIAGNOSIS — E11.9 TYPE 2 DIABETES, HBA1C GOAL < 7% (H): ICD-10-CM

## 2022-03-10 DIAGNOSIS — Z12.11 SCREEN FOR COLON CANCER: ICD-10-CM

## 2022-03-10 LAB
ANION GAP SERPL CALCULATED.3IONS-SCNC: 6 MMOL/L (ref 3–14)
BUN SERPL-MCNC: 10 MG/DL (ref 7–30)
CALCIUM SERPL-MCNC: 8.9 MG/DL (ref 8.5–10.1)
CHLORIDE BLD-SCNC: 105 MMOL/L (ref 94–109)
CHOLEST SERPL-MCNC: 193 MG/DL
CO2 SERPL-SCNC: 28 MMOL/L (ref 20–32)
CREAT SERPL-MCNC: 0.59 MG/DL (ref 0.52–1.04)
FASTING STATUS PATIENT QL REPORTED: YES
GFR SERPL CREATININE-BSD FRML MDRD: >90 ML/MIN/1.73M2
GLUCOSE BLD-MCNC: 145 MG/DL (ref 70–99)
HDLC SERPL-MCNC: 48 MG/DL
LDLC SERPL CALC-MCNC: 114 MG/DL
NONHDLC SERPL-MCNC: 145 MG/DL
POTASSIUM BLD-SCNC: 3.9 MMOL/L (ref 3.4–5.3)
SODIUM SERPL-SCNC: 139 MMOL/L (ref 133–144)
TRIGL SERPL-MCNC: 155 MG/DL

## 2022-03-10 PROCEDURE — 99396 PREV VISIT EST AGE 40-64: CPT | Performed by: FAMILY MEDICINE

## 2022-03-10 PROCEDURE — 99214 OFFICE O/P EST MOD 30 MIN: CPT | Mod: 25 | Performed by: FAMILY MEDICINE

## 2022-03-10 PROCEDURE — 80048 BASIC METABOLIC PNL TOTAL CA: CPT | Performed by: FAMILY MEDICINE

## 2022-03-10 PROCEDURE — 36415 COLL VENOUS BLD VENIPUNCTURE: CPT | Performed by: FAMILY MEDICINE

## 2022-03-10 PROCEDURE — 80061 LIPID PANEL: CPT | Performed by: FAMILY MEDICINE

## 2022-03-10 PROCEDURE — 82043 UR ALBUMIN QUANTITATIVE: CPT | Performed by: FAMILY MEDICINE

## 2022-03-10 PROCEDURE — 87624 HPV HI-RISK TYP POOLED RSLT: CPT | Performed by: FAMILY MEDICINE

## 2022-03-10 PROCEDURE — G0145 SCR C/V CYTO,THINLAYER,RESCR: HCPCS | Performed by: FAMILY MEDICINE

## 2022-03-10 PROCEDURE — 99207 PR FOOT EXAM NO CHARGE: CPT | Mod: 25 | Performed by: FAMILY MEDICINE

## 2022-03-10 RX ORDER — SIMVASTATIN 20 MG
20 TABLET ORAL AT BEDTIME
Qty: 90 TABLET | Refills: 3 | Status: SHIPPED | OUTPATIENT
Start: 2022-03-10 | End: 2023-03-08

## 2022-03-10 RX ORDER — ASPIRIN 81 MG/1
81 TABLET, CHEWABLE ORAL DAILY
Start: 2022-03-10

## 2022-03-10 RX ORDER — LOSARTAN POTASSIUM AND HYDROCHLOROTHIAZIDE 12.5; 5 MG/1; MG/1
1 TABLET ORAL DAILY
Qty: 90 TABLET | Refills: 1 | Status: SHIPPED | OUTPATIENT
Start: 2022-03-10 | End: 2022-10-28

## 2022-03-10 ASSESSMENT — PAIN SCALES - GENERAL: PAINLEVEL: NO PAIN (0)

## 2022-03-11 LAB
CREAT UR-MCNC: 74 MG/DL
MICROALBUMIN UR-MCNC: <5 MG/L
MICROALBUMIN/CREAT UR: NORMAL MG/G{CREAT}

## 2022-03-14 LAB
BKR LAB AP GYN ADEQUACY: NORMAL
BKR LAB AP GYN INTERPRETATION: NORMAL
BKR LAB AP HPV REFLEX: NORMAL
BKR LAB AP LMP: NORMAL
BKR LAB AP PREVIOUS ABNORMAL: NORMAL
PATH REPORT.COMMENTS IMP SPEC: NORMAL
PATH REPORT.COMMENTS IMP SPEC: NORMAL
PATH REPORT.RELEVANT HX SPEC: NORMAL

## 2022-03-18 LAB
HUMAN PAPILLOMA VIRUS 16 DNA: NEGATIVE
HUMAN PAPILLOMA VIRUS 18 DNA: NEGATIVE
HUMAN PAPILLOMA VIRUS FINAL DIAGNOSIS: NORMAL
HUMAN PAPILLOMA VIRUS OTHER HR: NEGATIVE

## 2022-03-22 ENCOUNTER — ANCILLARY PROCEDURE (OUTPATIENT)
Dept: MAMMOGRAPHY | Facility: CLINIC | Age: 52
End: 2022-03-22
Attending: FAMILY MEDICINE
Payer: COMMERCIAL

## 2022-03-22 ENCOUNTER — ANCILLARY PROCEDURE (OUTPATIENT)
Dept: ULTRASOUND IMAGING | Facility: CLINIC | Age: 52
End: 2022-03-22
Attending: FAMILY MEDICINE
Payer: COMMERCIAL

## 2022-03-22 DIAGNOSIS — N63.10 BREAST MASS, RIGHT: ICD-10-CM

## 2022-03-22 PROCEDURE — 77066 DX MAMMO INCL CAD BI: CPT | Performed by: RADIOLOGY

## 2022-03-22 PROCEDURE — 76642 ULTRASOUND BREAST LIMITED: CPT | Mod: RT | Performed by: RADIOLOGY

## 2022-03-22 PROCEDURE — G0279 TOMOSYNTHESIS, MAMMO: HCPCS | Performed by: RADIOLOGY

## 2022-05-08 ENCOUNTER — HEALTH MAINTENANCE LETTER (OUTPATIENT)
Age: 52
End: 2022-05-08

## 2023-01-27 DIAGNOSIS — I10 ESSENTIAL HYPERTENSION: ICD-10-CM

## 2023-01-27 RX ORDER — LOSARTAN POTASSIUM AND HYDROCHLOROTHIAZIDE 25; 100 MG/1; MG/1
TABLET ORAL
Qty: 45 TABLET | Refills: 0 | Status: SHIPPED | OUTPATIENT
Start: 2023-01-27 | End: 2023-03-08

## 2023-03-08 ENCOUNTER — MYC MEDICAL ADVICE (OUTPATIENT)
Dept: FAMILY MEDICINE | Facility: CLINIC | Age: 53
End: 2023-03-08
Payer: COMMERCIAL

## 2023-03-08 DIAGNOSIS — E78.1 HIGH TRIGLYCERIDES: ICD-10-CM

## 2023-03-08 DIAGNOSIS — I10 ESSENTIAL HYPERTENSION: ICD-10-CM

## 2023-03-08 RX ORDER — LOSARTAN POTASSIUM AND HYDROCHLOROTHIAZIDE 25; 100 MG/1; MG/1
TABLET ORAL
Qty: 45 TABLET | Refills: 0 | Status: SHIPPED | OUTPATIENT
Start: 2023-03-08 | End: 2023-06-14

## 2023-03-08 RX ORDER — SIMVASTATIN 20 MG
20 TABLET ORAL AT BEDTIME
Qty: 90 TABLET | Refills: 0 | Status: SHIPPED | OUTPATIENT
Start: 2023-03-08

## 2023-04-23 ENCOUNTER — HEALTH MAINTENANCE LETTER (OUTPATIENT)
Age: 53
End: 2023-04-23

## 2023-06-02 ENCOUNTER — HEALTH MAINTENANCE LETTER (OUTPATIENT)
Age: 53
End: 2023-06-02

## 2023-06-14 DIAGNOSIS — I10 ESSENTIAL HYPERTENSION: ICD-10-CM

## 2023-06-14 RX ORDER — LOSARTAN POTASSIUM AND HYDROCHLOROTHIAZIDE 25; 100 MG/1; MG/1
TABLET ORAL
Qty: 45 TABLET | OUTPATIENT
Start: 2023-06-14

## 2023-06-14 RX ORDER — LOSARTAN POTASSIUM AND HYDROCHLOROTHIAZIDE 25; 100 MG/1; MG/1
TABLET ORAL
Qty: 15 TABLET | Refills: 0 | Status: SHIPPED | OUTPATIENT
Start: 2023-06-14

## 2023-06-14 NOTE — TELEPHONE ENCOUNTER
Pharmacy requested duplicate. Medication refused.     Script sent to the pharmacy today. Patient unable to receive 90 day supply as patient is due for an appointment.     Sylvia Johns RN

## 2023-07-27 ENCOUNTER — TELEPHONE (OUTPATIENT)
Dept: FAMILY MEDICINE | Facility: CLINIC | Age: 53
End: 2023-07-27
Payer: COMMERCIAL

## 2023-07-27 DIAGNOSIS — I10 ESSENTIAL HYPERTENSION: ICD-10-CM

## 2023-07-27 NOTE — TELEPHONE ENCOUNTER
LVM for patient to call back and schedule an appointment prior to refilling medications.  Beth JAMES - [place]

## 2023-07-28 RX ORDER — LOSARTAN POTASSIUM AND HYDROCHLOROTHIAZIDE 25; 100 MG/1; MG/1
TABLET ORAL
Qty: 15 TABLET | Refills: 0 | OUTPATIENT
Start: 2023-07-28

## 2023-12-03 ENCOUNTER — HEALTH MAINTENANCE LETTER (OUTPATIENT)
Age: 53
End: 2023-12-03

## 2024-06-30 ENCOUNTER — HEALTH MAINTENANCE LETTER (OUTPATIENT)
Age: 54
End: 2024-06-30

## 2025-01-05 ENCOUNTER — HEALTH MAINTENANCE LETTER (OUTPATIENT)
Age: 55
End: 2025-01-05

## 2025-03-25 ENCOUNTER — TELEPHONE (OUTPATIENT)
Dept: FAMILY MEDICINE | Facility: CLINIC | Age: 55
End: 2025-03-25
Payer: COMMERCIAL

## 2025-03-25 NOTE — TELEPHONE ENCOUNTER
Patient Quality Outreach    Patient is due for the following:   Colon Cancer Screening    Action(s) Taken:   Colon screening reminder    Type of outreach:    Sent "[employer]" message.    Questions for provider review:    None         Gabriela Huffman MA  Chart routed to .